# Patient Record
Sex: FEMALE | Race: BLACK OR AFRICAN AMERICAN | Employment: OTHER | URBAN - METROPOLITAN AREA
[De-identification: names, ages, dates, MRNs, and addresses within clinical notes are randomized per-mention and may not be internally consistent; named-entity substitution may affect disease eponyms.]

---

## 2017-02-09 ENCOUNTER — HOSPITAL ENCOUNTER (OUTPATIENT)
Dept: GENERAL RADIOLOGY | Age: 77
Discharge: HOME | End: 2017-02-09
Attending: INTERNAL MEDICINE
Payer: MEDICARE

## 2017-02-09 ENCOUNTER — OFFICE VISIT (OUTPATIENT)
Dept: FAMILY MEDICINE CLINIC | Age: 77
End: 2017-02-09

## 2017-02-09 VITALS
BODY MASS INDEX: 40.81 KG/M2 | OXYGEN SATURATION: 96 % | DIASTOLIC BLOOD PRESSURE: 66 MMHG | WEIGHT: 260 LBS | SYSTOLIC BLOOD PRESSURE: 140 MMHG | TEMPERATURE: 97.8 F | HEIGHT: 67 IN | RESPIRATION RATE: 16 BRPM | HEART RATE: 77 BPM

## 2017-02-09 DIAGNOSIS — R07.89 CHEST DISCOMFORT: ICD-10-CM

## 2017-02-09 DIAGNOSIS — Z77.098 EXPOSURE TO INDUSTRIAL FUMES: ICD-10-CM

## 2017-02-09 DIAGNOSIS — Z77.098 EXPOSURE TO INDUSTRIAL FUMES: Primary | ICD-10-CM

## 2017-02-09 PROCEDURE — 71020 XR CHEST PA LAT: CPT

## 2017-02-09 RX ORDER — DICLOFENAC SODIUM 75 MG/1
75 TABLET, DELAYED RELEASE ORAL 2 TIMES DAILY
Refills: 1 | COMMUNITY
Start: 2017-01-26 | End: 2018-04-30

## 2017-02-09 NOTE — PROGRESS NOTES
Identified pt with two pt identifiers(name and ). Chief Complaint   Patient presents with    Chemical exposure     concerned that she may be inhaling fumes in her home that are harmful        Health Maintenance Due   Topic    GLAUCOMA SCREENING Q2Y        Wt Readings from Last 3 Encounters:   17 260 lb (117.9 kg)   10/20/16 257 lb (116.6 kg)   16 256 lb (116.1 kg)     Temp Readings from Last 3 Encounters:   10/20/16 97.5 °F (36.4 °C) (Oral)   16 98.2 °F (36.8 °C) (Oral)   16 98.1 °F (36.7 °C) (Oral)     BP Readings from Last 3 Encounters:   10/20/16 135/64   16 135/56   16 129/64     Pulse Readings from Last 3 Encounters:   10/20/16 72   16 75   16 78         Learning Assessment:  :     Learning Assessment 2015 3/11/2014   PRIMARY LEARNER Patient Patient   HIGHEST LEVEL OF EDUCATION - PRIMARY LEARNER  GRADUATED HIGH SCHOOL OR GED GRADUATED HIGH SCHOOL OR GED   BARRIERS PRIMARY LEARNER NONE NONE   CO-LEARNER CAREGIVER No No   PRIMARY LANGUAGE ENGLISH ENGLISH   LEARNER PREFERENCE PRIMARY LISTENING READING     - VIDEOS     - DEMONSTRATION   ANSWERED BY Patient patient   RELATIONSHIP SELF SELF       Depression Screening:  :     PHQ 2 / 9, over the last two weeks 2016   Little interest or pleasure in doing things Not at all   Feeling down, depressed or hopeless Not at all   Total Score PHQ 2 0       Fall Risk Assessment:  :     Fall Risk Assessment, last 12 mths 2016   Able to walk? Yes   Fall in past 12 months? No       Abuse Screening:  :     Abuse Screening Questionnaire 10/20/2016 10/14/2014 3/11/2014   Do you ever feel afraid of your partner? N N N   Are you in a relationship with someone who physically or mentally threatens you? N N N   Is it safe for you to go home?  Y Y Y       Coordination of Care Questionnaire:  :     1) Have you been to an emergency room, urgent care clinic since your last visit? no   Hospitalized since your last visit? no 2) Have you seen or consulted any other health care providers outside of [address] [place] [address] since your last visit? Yes, orthopaedist and had mammogram    3) Do you have an Advance Directive on file? no  Are you interested in receiving information about Advance Directives? no    Patient is accompanied by self. Reviewed record in preparation for visit and have obtained necessary documentation. Medication reconciliation up to date and corrected with patient at this time.

## 2017-02-10 LAB
ALBUMIN SERPL-MCNC: 4 G/DL (ref 3.5–4.8)
ALBUMIN/GLOB SERPL: 1.2 {RATIO} (ref 1.1–2.5)
ALP SERPL-CCNC: 66 IU/L (ref 39–117)
ALT SERPL-CCNC: 198 IU/L (ref 0–32)
AST SERPL-CCNC: 139 IU/L (ref 0–40)
BASOPHILS # BLD AUTO: 0 X10E3/UL (ref 0–0.2)
BASOPHILS NFR BLD AUTO: 0 %
BILIRUB SERPL-MCNC: 0.7 MG/DL (ref 0–1.2)
BUN SERPL-MCNC: 14 MG/DL (ref 8–27)
BUN/CREAT SERPL: 17 (ref 11–26)
CALCIUM SERPL-MCNC: 9.4 MG/DL (ref 8.7–10.3)
CHLORIDE SERPL-SCNC: 101 MMOL/L (ref 96–106)
CO2 SERPL-SCNC: 25 MMOL/L (ref 18–29)
CREAT SERPL-MCNC: 0.81 MG/DL (ref 0.57–1)
EOSINOPHIL # BLD AUTO: 0.4 X10E3/UL (ref 0–0.4)
EOSINOPHIL NFR BLD AUTO: 5 %
ERYTHROCYTE [DISTWIDTH] IN BLOOD BY AUTOMATED COUNT: 13.8 % (ref 12.3–15.4)
GLOBULIN SER CALC-MCNC: 3.4 G/DL (ref 1.5–4.5)
GLUCOSE SERPL-MCNC: 85 MG/DL (ref 65–99)
HCT VFR BLD AUTO: 41.7 % (ref 34–46.6)
HGB BLD-MCNC: 13.6 G/DL (ref 11.1–15.9)
IMM GRANULOCYTES # BLD: 0 X10E3/UL (ref 0–0.1)
IMM GRANULOCYTES NFR BLD: 0 %
LYMPHOCYTES # BLD AUTO: 1.5 X10E3/UL (ref 0.7–3.1)
LYMPHOCYTES NFR BLD AUTO: 18 %
MCH RBC QN AUTO: 29.7 PG (ref 26.6–33)
MCHC RBC AUTO-ENTMCNC: 32.6 G/DL (ref 31.5–35.7)
MCV RBC AUTO: 91 FL (ref 79–97)
MONOCYTES # BLD AUTO: 0.5 X10E3/UL (ref 0.1–0.9)
MONOCYTES NFR BLD AUTO: 6 %
NEUTROPHILS # BLD AUTO: 5.8 X10E3/UL (ref 1.4–7)
NEUTROPHILS NFR BLD AUTO: 71 %
PLATELET # BLD AUTO: 308 X10E3/UL (ref 150–379)
POTASSIUM SERPL-SCNC: 4.8 MMOL/L (ref 3.5–5.2)
PROT SERPL-MCNC: 7.4 G/DL (ref 6–8.5)
RBC # BLD AUTO: 4.58 X10E6/UL (ref 3.77–5.28)
SODIUM SERPL-SCNC: 141 MMOL/L (ref 134–144)
WBC # BLD AUTO: 8.2 X10E3/UL (ref 3.4–10.8)

## 2017-02-10 NOTE — PROGRESS NOTES
URGENT:  Labs show very elevated liver function and we need to evaluate this carefully. This is quite new. Has she been drinking any alcohol in addition to exposure to these fumes? I need to recheck her liver function test early next week, if still elevated, she may need further evaluation. She is to avoid being in contact with the fumes over the weekend.

## 2017-02-10 NOTE — PROGRESS NOTES
Pt was advised and verbalized understanding. She reports that she had a  out to the house today and he has taken care of the fumes coming from the pipes. She stated, \"Other than a glass of wine for New Year's she hasn't consumed any alcohol recently. We have scheduled her to return on 02/15/17.

## 2017-02-15 ENCOUNTER — TELEPHONE (OUTPATIENT)
Dept: FAMILY MEDICINE CLINIC | Age: 77
End: 2017-02-15

## 2017-02-15 ENCOUNTER — OFFICE VISIT (OUTPATIENT)
Dept: FAMILY MEDICINE CLINIC | Age: 77
End: 2017-02-15

## 2017-02-15 VITALS
OXYGEN SATURATION: 97 % | WEIGHT: 254 LBS | HEART RATE: 80 BPM | BODY MASS INDEX: 39.87 KG/M2 | DIASTOLIC BLOOD PRESSURE: 61 MMHG | HEIGHT: 67 IN | SYSTOLIC BLOOD PRESSURE: 125 MMHG | RESPIRATION RATE: 16 BRPM | TEMPERATURE: 97.8 F

## 2017-02-15 DIAGNOSIS — R79.89 ABNORMAL LFTS: Primary | ICD-10-CM

## 2017-02-15 DIAGNOSIS — J45.20 REACTIVE AIRWAY DISEASE, MILD INTERMITTENT, UNCOMPLICATED: ICD-10-CM

## 2017-02-15 DIAGNOSIS — K21.9 GASTROESOPHAGEAL REFLUX DISEASE WITHOUT ESOPHAGITIS: ICD-10-CM

## 2017-02-15 RX ORDER — BUDESONIDE AND FORMOTEROL FUMARATE DIHYDRATE 160; 4.5 UG/1; UG/1
2 AEROSOL RESPIRATORY (INHALATION) 2 TIMES DAILY
Qty: 1 INHALER | Refills: 3 | Status: SHIPPED | OUTPATIENT
Start: 2017-02-15 | End: 2017-02-22 | Stop reason: CLARIF

## 2017-02-15 RX ORDER — PHENOL/SODIUM PHENOLATE
20 AEROSOL, SPRAY (ML) MUCOUS MEMBRANE DAILY
Qty: 30 TAB | Refills: 3 | Status: SHIPPED | OUTPATIENT
Start: 2017-02-15 | End: 2018-04-30

## 2017-02-15 NOTE — PROGRESS NOTES
Identified pt with two pt identifiers(name and ). Chief Complaint   Patient presents with    Abnormal liver tests     here to repeat liver function labs - is concerned that the diclofenac tablets could have been affecting her liver so she has quit taking them since we advised her of her labs   [address] Maintenance Due   Topic    GLAUCOMA SCREENING Q2Y        Wt Readings from Last 3 Encounters:   02/15/17 254 lb (115.2 kg)   17 260 lb (117.9 kg)   10/20/16 257 lb (116.6 kg)     Temp Readings from Last 3 Encounters:   17 97.8 °F (36.6 °C) (Oral)   10/20/16 97.5 °F (36.4 °C) (Oral)   16 98.2 °F (36.8 °C) (Oral)     BP Readings from Last 3 Encounters:   17 140/66   10/20/16 135/64   16 135/56     Pulse Readings from Last 3 Encounters:   17 77   10/20/16 72   16 75         Learning Assessment:  :     Learning Assessment 2015 3/11/2014   PRIMARY LEARNER Patient Patient   HIGHEST LEVEL OF EDUCATION - PRIMARY LEARNER  GRADUATED HIGH SCHOOL OR GED GRADUATED HIGH SCHOOL OR GED   BARRIERS PRIMARY LEARNER NONE NONE   CO-LEARNER CAREGIVER No No   PRIMARY LANGUAGE ENGLISH ENGLISH   LEARNER PREFERENCE PRIMARY LISTENING READING     - VIDEOS     - DEMONSTRATION   ANSWERED BY Patient patient   RELATIONSHIP SELF SELF       Depression Screening:  :     PHQ 2 / 9, over the last two weeks 2016   Little interest or pleasure in doing things Not at all   Feeling down, depressed or hopeless Not at all   Total Score PHQ 2 0       Fall Risk Assessment:  :     Fall Risk Assessment, last 12 mths 2016   Able to walk? Yes   Fall in past 12 months? No       Abuse Screening:  :     Abuse Screening Questionnaire 10/20/2016 10/14/2014 3/11/2014   Do you ever feel afraid of your partner? N N N   Are you in a relationship with someone who physically or mentally threatens you? N N N   Is it safe for you to go home?  Elkin Dougherty       Coordination of Care Questionnaire:  :     1) Have you been to an emergency room, urgent care clinic since your last visit? no   Hospitalized since your last visit? no             2) Have you seen or consulted any other health care providers outside of [address] [place] [address] since your last visit? no  (Include any pap smears or colon screenings in this section.)    3) Do you have an Advance Directive on file? No  Are you interested in receiving information about Advance Directives? no    Patient is accompanied by self. Reviewed record in preparation for visit and have obtained necessary documentation. Medication reconciliation up to date and corrected with patient at this time.

## 2017-02-15 NOTE — TELEPHONE ENCOUNTER
Symbicort is too expensive. Not covered by insurance. Alternate medication? Best contact: [phone]  Thanks.

## 2017-02-15 NOTE — MR AVS SNAPSHOT
Visit Information Date & Time Provider Department Dept. Phone Encounter #  
 2/15/2017  1:00 PM Israel Shaikh  Upcoming Health Maintenance Date Due  
 GLAUCOMA SCREENING Q2Y 3/18/2003 Pneumococcal 65+ Low/Medium Risk (2 of 2 - PPSV23) 10/20/2017 MEDICARE YEARLY EXAM 10/21/2017 DTaP/Tdap/Td series (2 - Td) 10/20/2026 Allergies as of 2/15/2017  Review Complete On: 2/15/2017 By: Marylee Gustin, LPN No Known Allergies Current Immunizations  Reviewed on 10/20/2016 Name Date Td, Adsorbed PF 6/30/2015 10:35 AM  
  
 Not reviewed this visit You Were Diagnosed With   
  
 Codes Comments Abnormal LFTs    -  Primary ICD-10-CM: R79.89 ICD-9-CM: 790.6 Gastroesophageal reflux disease without esophagitis     ICD-10-CM: K21.9 ICD-9-CM: 530.81 Reactive airway disease, mild intermittent, uncomplicated     GARY-49-XK: J45.20 ICD-9-CM: 493.90 Vitals BP Pulse Temp Resp Height(growth percentile) Weight(growth percentile) 125/61 (BP 1 Location: Right arm, BP Patient Position: Sitting) 80 97.8 °F (36.6 °C) (Oral) 16 5' 7\" (1.702 m) 254 lb (115.2 kg) SpO2 BMI OB Status Smoking Status 97% 39.78 kg/m2 Ablation Never Smoker Vitals History BMI and BSA Data Body Mass Index Body Surface Area 39.78 kg/m 2 2.33 m 2 Preferred Pharmacy Pharmacy Name Phone [facility]/PHARMACY #[id number] - [place] - [address] [phone] Your Updated Medication List  
  
   
This list is accurate as of: 2/15/17  2:02 PM.  Always use your most recent med list.  
  
  
  
  
 budesonide-formoterol 160-4.5 mcg/actuation HFA inhaler Commonly known as:  SYMBICORT Take 2 Puffs by inhalation two (2) times a day. calcium carbonate 200 mg calcium (500 mg) Chew Commonly known as:  TUMS Take 1 Tab by mouth daily. cyanocobalamin 1,000 mcg sublingual tablet Commonly known as:  VITAMIN B-12 Take 1 tablet by mouth daily. diclofenac EC 75 mg EC tablet Commonly known as:  VOLTAREN Take 75 mg by mouth two (2) times a day. FISH OIL PO Take 1 Tab by mouth daily. Omeprazole delayed release 20 mg tablet Commonly known as:  PRILOSEC D/R Take 1 Tab by mouth daily. pyridoxine (vitamin B6) 100 mg tablet Commonly known as:  VITAMIN B-6 Take 1 tablet by mouth daily. Prescriptions Sent to Pharmacy Refills Omeprazole delayed release (PRILOSEC D/R) 20 mg tablet 3 Sig: Take 1 Tab by mouth daily. Class: Normal  
 Pharmacy: [facility]/pharmacy #[id number] - [place], [address] Ph #: [phone] Route: Oral  
 budesonide-formoterol (SYMBICORT) 160-4.5 mcg/actuation HFA inhaler 3 Sig: Take 2 Puffs by inhalation two (2) times a day. Class: Normal  
 Pharmacy: [facility]/pharmacy #[id number] - [place], [address] Ph #: [phone] Route: Inhalation We Performed the Following HEPATITIS PANEL, ACUTE [45013 CPT(R)] METABOLIC PANEL, COMPREHENSIVE [54293 CPT(R)] Introducing [facility]! Olimpia Chow introduces [employer] patient portal. Now you can access parts of your medical record, email your doctor's office, and request medication refills online. 1. In your internet browser, go to [email or web address] 2. Click on the First Time User? Click Here link in the Sign In box. You will see the New Member Sign Up page. 3. Enter your [employer] Access Code exactly as it appears below. You will not need to use this code after youve completed the sign-up process. If you do not sign up before the expiration date, you must request a new code. · [employer] Access Code: [email or web address] Expires: 5/10/2017  2:22 PM 
 
4. Enter the last four digits of your Social Security Number (xxxx) and Date of Birth (mm/dd/yyyy) as indicated and click Submit.  You will be taken to the next sign-up page. 5. Create a [employer] ID. This will be your [employer] login ID and cannot be changed, so think of one that is secure and easy to remember. 6. Create a [employer] password. You can change your password at any time. 7. Enter your Password Reset Question and Answer. This can be used at a later time if you forget your password. 8. Enter your e-mail address. You will receive e-mail notification when new information is available in [address] 9. Click Sign Up. You can now view and download portions of your medical record. 10. Click the Download Summary menu link to download a portable copy of your medical information. If you have questions, please visit the Frequently Asked Questions section of the [employer] website. Remember, [employer] is NOT to be used for urgent needs. For medical emergencies, dial 911. Now available from your iPhone and Android! Please provide this summary of care documentation to your next provider. Your primary care clinician is listed as [address]. If you have any questions after today's visit, please call [phone].

## 2017-02-15 NOTE — PROGRESS NOTES
CC:  Chief Complaint   Patient presents with    Chemical exposure     concerned that she may be inhaling fumes in her home that are harmful - the  has figured out that two of the drains she does not use have a bacteria that has built up a gas chemical and they are working to correct       [address]  Tl Dickerson is a 64 y.o. female. HPI Comments: 72F with h/o arthritis and high cholesterol presents with new c/o exposure to fumes coming from her pipes at home. Some of the smell was chlorinated. She now has cough and congestion and not feeling well. She reports that a  has come out to check on the pipes and repairs have started. During this time, she did stay in her home. She denies wheezing, SOB. NO headaches. Chemical exposure       ROS:  Review of Systems   All other systems reviewed and are negative. OBJECTIVE:  Visit Vitals    /66 (BP 1 Location: Right arm, BP Patient Position: Sitting)    Pulse 77    Temp 97.8 °F (36.6 °C) (Oral)    Resp 16    Ht 5' 7\" (1.702 m)    Wt 260 lb (117.9 kg)    SpO2 96%    BMI 40.72 kg/m2   Physical Exam   Constitutional: She is oriented to person, place, and time. HENT:   Head: Normocephalic. Eyes: Conjunctivae and EOM are normal. Pupils are equal, round, and reactive to light. Neck: Normal range of motion. Neck supple. Cardiovascular: Normal rate and regular rhythm. Pulmonary/Chest: Effort normal and breath sounds normal.   Musculoskeletal: Normal range of motion. Neurological: She is alert and oriented to person, place, and time. Skin: Skin is warm. Psychiatric: She has a normal mood and affect. Her behavior is normal.   Nursing note and vitals reviewed. ASSESSMENT and PLAN    ICD-10-CM ICD-9-CM    1.  Exposure to industrial fumes Z77.098 V87.2 diclofenac EC (VOLTAREN) 75 mg EC tablet      XR CHEST PA LAT      CBC WITH AUTOMATED DIFF      METABOLIC PANEL, COMPREHENSIVE   2. Chest discomfort R07.89 786.59 diclofenac EC (VOLTAREN) 75 mg EC tablet      XR CHEST PA LAT      CBC WITH AUTOMATED DIFF      METABOLIC PANEL, COMPREHENSIVE     76F who presents with symptoms that she feels are from inhaling fumes from her home. Will send for CXR and also lab work. She was unable to tell me what type of fumes are suspected. She denies cough, but has had some chest discomfort. We discussed that she should try to be out of her home while this is being fixed. She reports that things are better now and that most of the inhalation was last week. Worrisome symptoms discussed in detail. Pt verbalizes understanding of plan of care and denies further questions or concerns at this time. Follow-up Disposition:  Return if symptoms worsen or fail to improve.

## 2017-02-16 LAB
ALBUMIN SERPL-MCNC: 4.1 G/DL (ref 3.5–4.8)
ALBUMIN/GLOB SERPL: 1.5 {RATIO} (ref 1.1–2.5)
ALP SERPL-CCNC: 68 IU/L (ref 39–117)
ALT SERPL-CCNC: 288 IU/L (ref 0–32)
AST SERPL-CCNC: 95 IU/L (ref 0–40)
BILIRUB SERPL-MCNC: 0.9 MG/DL (ref 0–1.2)
BUN SERPL-MCNC: 14 MG/DL (ref 8–27)
BUN/CREAT SERPL: 18 (ref 11–26)
CALCIUM SERPL-MCNC: 9.4 MG/DL (ref 8.7–10.3)
CHLORIDE SERPL-SCNC: 99 MMOL/L (ref 96–106)
CO2 SERPL-SCNC: 26 MMOL/L (ref 18–29)
CREAT SERPL-MCNC: 0.79 MG/DL (ref 0.57–1)
GLOBULIN SER CALC-MCNC: 2.8 G/DL (ref 1.5–4.5)
GLUCOSE SERPL-MCNC: 97 MG/DL (ref 65–99)
HAV IGM SERPL QL IA: NEGATIVE
HBV CORE IGM SERPL QL IA: NEGATIVE
HBV SURFACE AG SERPL QL IA: NEGATIVE
HCV AB S/CO SERPL IA: 0.4 S/CO RATIO (ref 0–0.9)
POTASSIUM SERPL-SCNC: 5 MMOL/L (ref 3.5–5.2)
PROT SERPL-MCNC: 6.9 G/DL (ref 6–8.5)
SODIUM SERPL-SCNC: 139 MMOL/L (ref 134–144)

## 2017-02-20 DIAGNOSIS — R79.89 ELEVATED LFTS: Primary | ICD-10-CM

## 2017-02-20 DIAGNOSIS — R74.01 TRANSAMINITIS: ICD-10-CM

## 2017-02-20 NOTE — PROGRESS NOTES
HISTORY OF PRESENT ILLNESS  Krystin Whittaker is a 64 y.o. female. HPI Comments: Who presents today for follow up and repeat LFT's. I recall that she was seen about 1-week ago and at that time, she c/o being exposed to fumes coming from her pipes. She reports that things are being fixed right now. She has the sensation of a heaviness on her chest. I recall that we did send her for CXR which was normal. Here to repeat the labs as her LFT's were quite elevated. She denies abdominal pain. No N/V/D. Labs         Review of Systems   All other systems reviewed and are negative. Physical Exam   Nursing note and vitals reviewed. ASSESSMENT and PLAN    ICD-10-CM ICD-9-CM    1. Abnormal LFTs P11.27 129.4 METABOLIC PANEL, COMPREHENSIVE      HEPATITIS PANEL, ACUTE   2. Gastroesophageal reflux disease without esophagitis K21.9 530.81    3. Reactive airway disease, mild intermittent, uncomplicated G58.26 049.99 Omeprazole delayed release (PRILOSEC D/R) 20 mg tablet      budesonide-formoterol (SYMBICORT) 160-4.5 mcg/actuation HFA inhaler     Will send for repeat LFT\"s. It is unclear if her exposure is causing the problem. Will also start her on an inhaler due to the sensation she has on her lungs. I have warned her about staying in her home during this time. The patient feels that it is safe. She feels that the fumes are under control at this time. Will advise when the labs return. Pt verbalizes understanding of plan of care and denies further questions or concerns at this time. Follow-up Disposition:  Return if symptoms worsen or fail to improve.

## 2017-02-20 NOTE — PROGRESS NOTES
Spoke with patient about her labs. Her ALT is very elevated at 288. AST went down to 139-->95. I have put her in for a RUQ US. The patient denies N/V/ or significant discomfort. Unclear what is driving the elevated ALT except for recent exposure to fumes in her home - which she now says is associated with her septic tank. I have asked her to return this week to recheck the levels.

## 2017-02-22 ENCOUNTER — OFFICE VISIT (OUTPATIENT)
Dept: FAMILY MEDICINE CLINIC | Age: 77
End: 2017-02-22

## 2017-02-22 VITALS
DIASTOLIC BLOOD PRESSURE: 65 MMHG | HEART RATE: 84 BPM | RESPIRATION RATE: 16 BRPM | TEMPERATURE: 98.9 F | HEIGHT: 67 IN | OXYGEN SATURATION: 97 % | WEIGHT: 254 LBS | BODY MASS INDEX: 39.87 KG/M2 | SYSTOLIC BLOOD PRESSURE: 138 MMHG

## 2017-02-22 DIAGNOSIS — R79.89 ABNORMAL LFTS: Primary | ICD-10-CM

## 2017-02-22 RX ORDER — ALBUTEROL SULFATE 90 UG/1
1 AEROSOL, METERED RESPIRATORY (INHALATION)
Qty: 1 INHALER | Refills: 3 | Status: SHIPPED | OUTPATIENT
Start: 2017-02-22 | End: 2017-03-24

## 2017-02-22 NOTE — PROGRESS NOTES
CC:  Chief Complaint   Patient presents with    Abnormal liver tests     pt here for follow up - reports that she is scheduled for the u/s that was ordered on this coming Monday [address]  Marianna Friend is a 64 y.o. female. HPI Comments: Who presents today for follow up of elevated LFT's. She reports that the gas leak in her house is now under control and she is feeling well. She is here to repeat labs and if still elevated, will pursue further work up. ROS:  Review of Systems   All other systems reviewed and are negative. OBJECTIVE:  Visit Vitals    /65 (BP 1 Location: Right arm, BP Patient Position: Sitting)    Pulse 84    Temp 98.9 °F (37.2 °C) (Oral)    Resp 16    Ht 5' 7\" (1.702 m)    Wt 254 lb (115.2 kg)    SpO2 97%    BMI 39.78 kg/m2   Physical Exam   Cardiovascular: Normal rate and regular rhythm. Pulmonary/Chest: Effort normal.   Abdominal: She exhibits no distension and no mass. There is no tenderness. There is no rebound and no guarding. Nursing note and vitals reviewed. ASSESSMENT and PLAN    ICD-10-CM ICD-9-CM    1. Abnormal LFTs R79.89 790.6 HEPATIC FUNCTION PANEL      GGT      albuterol (PROVENTIL HFA, VENTOLIN HFA, PROAIR HFA) 90 mcg/actuation inhaler     Will advise when the labs return. Pt verbalizes understanding of plan of care and denies further questions or concerns at this time. Follow-up Disposition:  Return if symptoms worsen or fail to improve.

## 2017-02-22 NOTE — MR AVS SNAPSHOT
Visit Information Date & Time Provider Department Dept. Phone Encounter #  
 2/22/2017  1:00 PM Israel Moore [phone] Upcoming Health Maintenance Date Due  
 GLAUCOMA SCREENING Q2Y 3/18/2003 Pneumococcal 65+ Low/Medium Risk (2 of 2 - PPSV23) 10/20/2017 MEDICARE YEARLY EXAM 10/21/2017 DTaP/Tdap/Td series (2 - Td) 10/20/2026 Allergies as of 2/22/2017  Review Complete On: 2/22/2017 By: Shasha Aguilar LPN No Known Allergies Current Immunizations  Reviewed on 10/20/2016 Name Date Td, Adsorbed PF 6/30/2015 10:35 AM  
  
 Not reviewed this visit You Were Diagnosed With   
  
 Codes Comments Abnormal LFTs    -  Primary ICD-10-CM: R79.89 ICD-9-CM: 790.6 Vitals BP  
  
  
  
  
  
 138/65 (BP 1 Location: Right arm, BP Patient Position: Sitting) Vitals History BMI and BSA Data Body Mass Index Body Surface Area 39.78 kg/m 2 2.33 m 2 Preferred Pharmacy Pharmacy Name Phone [facility]/PHARMACY #[id number] - [place] - [address] [phone] Your Updated Medication List  
  
   
This list is accurate as of: 2/22/17  1:33 PM.  Always use your most recent med list.  
  
  
  
  
 albuterol 90 mcg/actuation inhaler Commonly known as:  PROVENTIL HFA, VENTOLIN HFA, PROAIR HFA Take 1 Puff by inhalation every four (4) hours as needed for Wheezing for up to 30 days. beclomethasone 80 mcg/actuation inhaler Commonly known as:  QVAR Take 1 Puff by inhalation two (2) times a day for 30 days. calcium carbonate 200 mg calcium (500 mg) Chew Commonly known as:  TUMS Take 1 Tab by mouth daily. cyanocobalamin 1,000 mcg sublingual tablet Commonly known as:  VITAMIN B-12 Take 1 tablet by mouth daily. diclofenac EC 75 mg EC tablet Commonly known as:  VOLTAREN Take 75 mg by mouth two (2) times a day. FISH OIL PO Take 1 Tab by mouth daily. Omeprazole delayed release 20 mg tablet Commonly known as:  PRILOSEC D/R Take 1 Tab by mouth daily. pyridoxine (vitamin B6) 100 mg tablet Commonly known as:  VITAMIN B-6 Take 1 tablet by mouth daily. Prescriptions Sent to Pharmacy Refills  
 albuterol (PROVENTIL HFA, VENTOLIN HFA, PROAIR HFA) 90 mcg/actuation inhaler 3 Sig: Take 1 Puff by inhalation every four (4) hours as needed for Wheezing for up to 30 days. Class: Normal  
 Pharmacy: [facility]/pharmacy #[id number] - [place], [place] - [address]  #: [phone] Route: Inhalation We Performed the Following GGT [84350 CPT(R)] HEPATIC FUNCTION PANEL [11806 CPT(R)] To-Do List   
 02/27/2017 9:15 AM  
(Arrive by 9:00 AM) Appointment with [address] [place] [address] at [facility] Ultrasound Department ([phone]) General  NPO DIET RESTICTIONS Please be NPO (nothing by mouth) for 6- 8 hours prior to procedure. GENERAL INSTRUCTIONS 1. Bring any non [facility] facility films/reports pertaining to the area being studied with you on the day of appointment. 2. A written order with a valid diagnosis and Physicians signature is required for all scheduled tests. 3. Check in at registration 30 minutes before your appointment time unless you were instructed to do otherwise. Please arrive 15 minutes prior to appointment to register Introducing [facility]! Vishnu Yanez introduces "[employer]" patient portal. Now you can access parts of your medical record, email your doctor's office, and request medication refills online. 1. In your internet browser, go to [email or web address] 2. Click on the First Time User? Click Here link in the Sign In box. You will see the New Member Sign Up page. 3. Enter your "[employer]" Access Code exactly as it appears below. You will not need to use this code after youve completed the sign-up process.  If you do not sign up before the expiration date, you must request a new code. · [employer] Access Code: [email or web address] Expires: 5/10/2017  2:22 PM 
 
4. Enter the last four digits of your Social Security Number (xxxx) and Date of Birth (mm/dd/yyyy) as indicated and click Submit. You will be taken to the next sign-up page. 5. Create a [employer] ID. This will be your [employer] login ID and cannot be changed, so think of one that is secure and easy to remember. 6. Create a [employer] password. You can change your password at any time. 7. Enter your Password Reset Question and Answer. This can be used at a later time if you forget your password. 8. Enter your e-mail address. You will receive e-mail notification when new information is available in [address] 9. Click Sign Up. You can now view and download portions of your medical record. 10. Click the Download Summary menu link to download a portable copy of your medical information. If you have questions, please visit the Frequently Asked Questions section of the [employer] website. Remember, [employer] is NOT to be used for urgent needs. For medical emergencies, dial 911. Now available from your iPhone and Android! Please provide this summary of care documentation to your next provider. Your primary care clinician is listed as [address]. If you have any questions after today's visit, please call [phone].

## 2017-02-23 ENCOUNTER — TELEPHONE (OUTPATIENT)
Dept: FAMILY MEDICINE CLINIC | Age: 77
End: 2017-02-23

## 2017-02-23 LAB
ALBUMIN SERPL-MCNC: 4 G/DL (ref 3.5–4.8)
ALP SERPL-CCNC: 69 IU/L (ref 39–117)
ALT SERPL-CCNC: 87 IU/L (ref 0–32)
AST SERPL-CCNC: 31 IU/L (ref 0–40)
BILIRUB DIRECT SERPL-MCNC: 0.17 MG/DL (ref 0–0.4)
BILIRUB SERPL-MCNC: 0.7 MG/DL (ref 0–1.2)
GGT SERPL-CCNC: 29 IU/L (ref 0–60)
PROT SERPL-MCNC: 7.3 G/DL (ref 6–8.5)

## 2017-02-23 NOTE — TELEPHONE ENCOUNTER
Pt returned call, was advised of her lab results and the need to return in 2 wks for office visit and repeat labs and verbalized understanding.

## 2017-02-23 NOTE — TELEPHONE ENCOUNTER
ALICIAM for pt to call the office to discuss her lab results. Can you please try to put her through to me when she returns the call. Thanks.

## 2017-02-23 NOTE — PROGRESS NOTES
Good news! Liver function test almost back to normal! Dramatic improvement. More than likely, the fumes were causing the problem. Hopefully, she will no longer have exposure to this. Will see her in 2-weeks for general follow up and repeat labs.

## 2017-02-27 ENCOUNTER — HOSPITAL ENCOUNTER (OUTPATIENT)
Dept: ULTRASOUND IMAGING | Age: 77
Discharge: HOME | End: 2017-02-27
Attending: INTERNAL MEDICINE
Payer: MEDICARE

## 2017-02-27 DIAGNOSIS — R74.01 TRANSAMINITIS: ICD-10-CM

## 2017-02-27 DIAGNOSIS — R79.89 ELEVATED LFTS: ICD-10-CM

## 2017-02-27 PROCEDURE — 76705 ECHO EXAM OF ABDOMEN: CPT

## 2017-02-27 NOTE — PROGRESS NOTES
Please let patient know that her liver US and abdominal US was completely normal.   Looks like everything is back to normal.   She should follow up with me in 2-3 months.

## 2017-02-27 NOTE — LETTER
2/27/2017 4:03 PM 
 
Ms. Amari Hatch [address] [phone] Dear Amari Hatch: Please find your most recent results below. Resulted Orders US ABD LTD Narrative ULTRASOUND OF THE RIGHT UPPER QUADRANT INDICATION: Right upper quadrant abdominal pain, elevated LFTs. No fever or 
leukocytosis. COMPARISON: None. TECHNIQUE: 
Sonography of the right upper quadrant was performed. FINDINGS: 
 
GALLBLADDER: No gallstones, wall thickening, or pericholecystic fluid. COMMON DUCT: 0. 5 cm in diameter. The duct is normal caliber. LIVER: Normal echogenicity. No focal hepatic mass or intrahepatic biliary 
dilatation is shown. Incidental note is made of small cysts. PANCREAS: The visualized portions of the pancreas are normal.  
RIGHT KIDNEY: 11.6 cm in length. No hydronephrosis, shadowing calculus, or 
contour-deforming renal mass. Incidental small cyst. 
 
  
 Impression IMPRESSION:  
No acute process shown. RECOMMENDATIONS: 
Your Ultrasound of your liver and abdomen were completely normal. Looks like everything is back to normal. Please follow up with me in 2-3 months. Please call me if you have any questions: [phone] Sincerely, [address] [facility] 1

## 2017-07-11 ENCOUNTER — OFFICE VISIT (OUTPATIENT)
Dept: FAMILY MEDICINE CLINIC | Age: 77
End: 2017-07-11

## 2017-07-11 VITALS
DIASTOLIC BLOOD PRESSURE: 63 MMHG | TEMPERATURE: 98.5 F | OXYGEN SATURATION: 96 % | WEIGHT: 261.4 LBS | HEIGHT: 67 IN | RESPIRATION RATE: 20 BRPM | HEART RATE: 75 BPM | BODY MASS INDEX: 41.03 KG/M2 | SYSTOLIC BLOOD PRESSURE: 129 MMHG

## 2017-07-11 DIAGNOSIS — R26.89 BALANCE PROBLEM: ICD-10-CM

## 2017-07-11 DIAGNOSIS — Z12.11 SCREENING FOR COLON CANCER: ICD-10-CM

## 2017-07-11 DIAGNOSIS — Z13.5 SCREENING FOR GLAUCOMA: ICD-10-CM

## 2017-07-11 DIAGNOSIS — E78.00 PURE HYPERCHOLESTEROLEMIA: Primary | ICD-10-CM

## 2017-07-11 DIAGNOSIS — E55.9 VITAMIN D DEFICIENCY: ICD-10-CM

## 2017-07-11 DIAGNOSIS — R07.89 CHEST DISCOMFORT: ICD-10-CM

## 2017-07-11 DIAGNOSIS — Z77.098 EXPOSURE TO INDUSTRIAL FUMES: ICD-10-CM

## 2017-07-11 RX ORDER — ERGOCALCIFEROL 1.25 MG/1
50000 CAPSULE ORAL
Qty: 5 CAP | Refills: 3 | Status: SHIPPED | OUTPATIENT
Start: 2017-07-11 | End: 2017-08-10

## 2017-07-11 RX ORDER — PYRIDOXINE HCL (VITAMIN B6) 100 MG
100 TABLET ORAL DAILY
Qty: 30 TAB | Refills: 3 | Status: SHIPPED | OUTPATIENT
Start: 2017-07-11

## 2017-07-11 RX ORDER — DICLOFENAC SODIUM 75 MG/1
75 TABLET, DELAYED RELEASE ORAL 2 TIMES DAILY
Qty: 60 TAB | Refills: 1 | Status: CANCELLED | OUTPATIENT
Start: 2017-07-11

## 2017-07-11 NOTE — PROGRESS NOTES
Identified pt with two pt identifiers(name and ). Chief Complaint   Patient presents with    Abnormal liver tests     recheck labs    Referral Shana Cabello and Dr Jennifer Wang from Last 3 Encounters:   17 261 lb 6.4 oz (118.6 kg)   17 254 lb (115.2 kg)   02/15/17 254 lb (115.2 kg)     Temp Readings from Last 3 Encounters:   17 98.5 °F (36.9 °C) (Oral)   17 98.9 °F (37.2 °C) (Oral)   02/15/17 97.8 °F (36.6 °C) (Oral)     BP Readings from Last 3 Encounters:   17 129/63   17 138/65   02/15/17 125/61     Pulse Readings from Last 3 Encounters:   17 75   17 84   02/15/17 80         Learning Assessment:  :     Learning Assessment 2015 3/11/2014   PRIMARY LEARNER Patient Patient   HIGHEST LEVEL OF EDUCATION - PRIMARY LEARNER  GRADUATED HIGH SCHOOL OR GED GRADUATED HIGH SCHOOL OR GED   BARRIERS PRIMARY LEARNER NONE NONE   CO-LEARNER CAREGIVER No No   PRIMARY LANGUAGE ENGLISH ENGLISH   LEARNER PREFERENCE PRIMARY LISTENING READING     - VIDEOS     - DEMONSTRATION   ANSWERED BY Patient patient   RELATIONSHIP SELF SELF       Depression Screening:  :     PHQ over the last two weeks 2017   Little interest or pleasure in doing things Not at all   Feeling down, depressed or hopeless Not at all   Total Score PHQ 2 0       Fall Risk Assessment:  :     Fall Risk Assessment, last 12 mths 2017   Able to walk? Yes   Fall in past 12 months? No       Abuse Screening:  :     Abuse Screening Questionnaire 2017 10/20/2016 10/14/2014 3/11/2014   Do you ever feel afraid of your partner? N N N N   Are you in a relationship with someone who physically or mentally threatens you? N N N N   Is it safe for you to go home?  Y Y Y Y       Coordination of Care Questionnaire:  :     1) Have you been to an emergency room, urgent care clinic since your last visit? no   Hospitalized since your last visit? no             2) Have you seen or consulted any other health care providers outside of [address] [place] [address] since your last visit? no  (Include any pap smears or colon screenings in this section.)    3) Do you have an Advance Directive on file? no  Are you interested in receiving information about Advance Directives? no    Reviewed record in preparation for visit and have obtained necessary documentation. Medication reconciliation up to date and corrected with patient at this time.    She is allergic the the powder in latex gloves

## 2017-07-11 NOTE — MR AVS SNAPSHOT
Visit Information Date & Time Provider Department Dept. Phone Encounter #  
 7/11/2017  1:00 PM Ned Thompson Israel Travis [phone] Upcoming Health Maintenance Date Due  
 GLAUCOMA SCREENING Q2Y 3/18/2003 INFLUENZA AGE 9 TO ADULT 8/1/2017 Pneumococcal 65+ Low/Medium Risk (2 of 2 - PPSV23) 10/20/2017 MEDICARE YEARLY EXAM 10/21/2017 DTaP/Tdap/Td series (2 - Td) 10/20/2026 Allergies as of 7/11/2017  Review Complete On: 7/11/2017 By: Ned Thompson MD  
  
 Severity Noted Reaction Type Reactions Latex  07/11/2017    Other (comments) Patient is allergic to the powder in the gloves. Itchy eyes and rash. Current Immunizations  Reviewed on 10/20/2016 Name Date Td, Adsorbed PF 6/30/2015 10:35 AM  
  
 Not reviewed this visit You Were Diagnosed With   
  
 Codes Comments Pure hypercholesterolemia    -  Primary ICD-10-CM: E78.00 ICD-9-CM: 272.0 Chest discomfort     ICD-10-CM: R07.89 ICD-9-CM: 786.59 Exposure to industrial fumes     ICD-10-CM: N02.527 ICD-9-CM: V87.2 Screening for glaucoma     ICD-10-CM: Z13.5 ICD-9-CM: V80.1 Vitamin D deficiency     ICD-10-CM: E55.9 ICD-9-CM: 268.9 Screening for colon cancer     ICD-10-CM: Z12.11 ICD-9-CM: V76.51 Vitals BP Pulse Temp Resp Height(growth percentile) Weight(growth percentile) 129/63 (BP 1 Location: Right arm, BP Patient Position: Sitting) 75 98.5 °F (36.9 °C) (Oral) 20 5' 7\" (1.702 m) 261 lb 6.4 oz (118.6 kg) SpO2 BMI OB Status Smoking Status 96% 40.94 kg/m2 Ablation Never Smoker Vitals History BMI and BSA Data Body Mass Index Body Surface Area 40.94 kg/m 2 2.37 m 2 Preferred Pharmacy Pharmacy Name Phone [id number] [place] - [address] [facility] [address] [phone] Your Updated Medication List  
  
   
This list is accurate as of: 7/11/17  1:37 PM.  Always use your most recent med list.  
  
  
  
  
 calcium carbonate 200 mg calcium (500 mg) Chew Commonly known as:  TUMS Take 1 Tab by mouth daily. cyanocobalamin 1,000 mcg sublingual tablet Commonly known as:  VITAMIN B-12 Take 1 tablet by mouth daily. diclofenac EC 75 mg EC tablet Commonly known as:  VOLTAREN Take 75 mg by mouth two (2) times a day. ergocalciferol 50,000 unit capsule Commonly known as:  ERGOCALCIFEROL Take 1 Cap by mouth every seven (7) days for 30 days. FISH OIL PO Take 1 Tab by mouth daily. Omeprazole delayed release 20 mg tablet Commonly known as:  PRILOSEC D/R Take 1 Tab by mouth daily. pyridoxine (vitamin B6) 100 mg tablet Commonly known as:  VITAMIN B-6 Take 1 Tab by mouth daily. Prescriptions Sent to Pharmacy Refills  
 pyridoxine, vitamin B6, (VITAMIN B-6) 100 mg tablet 3 Sig: Take 1 Tab by mouth daily. Class: Normal  
 Pharmacy: [facility] #[id number] [address] Ph #: [phone] Route: Oral  
 ergocalciferol (ERGOCALCIFEROL) 50,000 unit capsule 3 Sig: Take 1 Cap by mouth every seven (7) days for 30 days. Class: Normal  
 Pharmacy: [facility]/pharmacy #[id number] [address] Ph #: [phone] Route: Oral  
  
We Performed the Following CBC WITH AUTOMATED DIFF [90021 CPT(R)] CRP, HIGH SENSITIVITY [15074 CPT(R)] LIPID PANEL [73285 CPT(R)] METABOLIC PANEL, COMPREHENSIVE [33879 CPT(R)] REFERRAL TO GASTROENTEROLOGY [JCA85 Custom] Comments:  
 Please evaluate patient for follow up and possible colonoscopy. REFERRAL TO OPHTHALMOLOGY [REF57 Custom] Comments:  
 Please evaluate patient for screening for glaucoma. VITAMIN D, 25 HYDROXY [id number] CPT(R)] Referral Information Referral ID Referred By Referred To  
  
 [id number] Trinity Bella [facility] [address] [facility], [address] Visits Status Start Date End Date 1 New Request 7/11/17 7/11/18 If your referral has a status of pending review or denied, additional information will be sent to support the outcome of this decision. Referral ID Referred By Referred To  
 [id number] Trinity Bella [facility] [place]  
   [address] Senia Kim Phone: [phone] Fax: [phone] Visits Status Start Date End Date 1 New Request 7/11/17 7/11/18 If your referral has a status of pending review or denied, additional information will be sent to support the outcome of this decision. Introducing [facility] & HEALTH SERVICES! Taina Durán introduces [employer] patient portal. Now you can access parts of your medical record, email your doctor's office, and request medication refills online. 1. In your internet browser, go to [email or web address] 2. Click on the First Time User? Click Here link in the Sign In box. You will see the New Member Sign Up page. 3. Enter your Songza Access Code exactly as it appears below. You will not need to use this code after youve completed the sign-up process. If you do not sign up before the expiration date, you must request a new code. · [employer] Access Code: [email or web address] Expires: 10/9/2017  1:20 PM 
 
4. Enter the last four digits of your Social Security Number (xxxx) and Date of Birth (mm/dd/yyyy) as indicated and click Submit. You will be taken to the next sign-up page. 5. Create a [employer] ID. This will be your Songza login ID and cannot be changed, so think of one that is secure and easy to remember. 6. Create a [employer] password. You can change your password at any time. 7. Enter your Password Reset Question and Answer. This can be used at a later time if you forget your password. 8. Enter your e-mail address. You will receive e-mail notification when new information is available in [address] 9. Click Sign Up. You can now view and download portions of your medical record. 10. Click the Download Summary menu link to download a portable copy of your medical information. If you have questions, please visit the Frequently Asked Questions section of the [employer] website. Remember, [employer] is NOT to be used for urgent needs. For medical emergencies, dial 911. Now available from your iPhone and Android! Please provide this summary of care documentation to your next provider. Your primary care clinician is listed as [address]. If you have any questions after today's visit, please call [phone].

## 2017-07-17 ENCOUNTER — TELEPHONE (OUTPATIENT)
Dept: FAMILY MEDICINE CLINIC | Age: 77
End: 2017-07-17

## 2017-07-17 ENCOUNTER — LAB ONLY (OUTPATIENT)
Dept: FAMILY MEDICINE CLINIC | Age: 77
End: 2017-07-17

## 2017-07-17 NOTE — TELEPHONE ENCOUNTER
Patient is taking vitamin D supplements at 2000 units, daily. Patient's paperwork states that her medication list has been updated to show vitamin D 50,000 units every 7 days. Patient wants clarification on which she is to be taking. Salina Mccullough had labs drawn 7/17/17      Best contact: [phone]    Thank you.

## 2017-07-18 LAB
25(OH)D3+25(OH)D2 SERPL-MCNC: 25.5 NG/ML (ref 30–100)
ALBUMIN SERPL-MCNC: 3.8 G/DL (ref 3.5–4.8)
ALBUMIN/GLOB SERPL: 1.2 {RATIO} (ref 1.2–2.2)
ALP SERPL-CCNC: 64 IU/L (ref 39–117)
ALT SERPL-CCNC: 42 IU/L (ref 0–32)
AST SERPL-CCNC: 32 IU/L (ref 0–40)
BASOPHILS # BLD AUTO: 0 X10E3/UL (ref 0–0.2)
BASOPHILS NFR BLD AUTO: 0 %
BILIRUB SERPL-MCNC: 1 MG/DL (ref 0–1.2)
BUN SERPL-MCNC: 14 MG/DL (ref 8–27)
BUN/CREAT SERPL: 19 (ref 12–28)
CALCIUM SERPL-MCNC: 9.2 MG/DL (ref 8.7–10.3)
CHLORIDE SERPL-SCNC: 101 MMOL/L (ref 96–106)
CHOLEST SERPL-MCNC: 240 MG/DL (ref 100–199)
CO2 SERPL-SCNC: 23 MMOL/L (ref 18–29)
CREAT SERPL-MCNC: 0.72 MG/DL (ref 0.57–1)
CRP SERPL HS-MCNC: 7.44 MG/L (ref 0–3)
EOSINOPHIL # BLD AUTO: 0.3 X10E3/UL (ref 0–0.4)
EOSINOPHIL NFR BLD AUTO: 5 %
ERYTHROCYTE [DISTWIDTH] IN BLOOD BY AUTOMATED COUNT: 14.6 % (ref 12.3–15.4)
GLOBULIN SER CALC-MCNC: 3.2 G/DL (ref 1.5–4.5)
GLUCOSE SERPL-MCNC: 90 MG/DL (ref 65–99)
HCT VFR BLD AUTO: 41.3 % (ref 34–46.6)
HDLC SERPL-MCNC: 70 MG/DL
HGB BLD-MCNC: 13 G/DL (ref 11.1–15.9)
IMM GRANULOCYTES # BLD: 0 X10E3/UL (ref 0–0.1)
IMM GRANULOCYTES NFR BLD: 0 %
INTERPRETATION, 910389: NORMAL
LDLC SERPL CALC-MCNC: 155 MG/DL (ref 0–99)
LYMPHOCYTES # BLD AUTO: 1.5 X10E3/UL (ref 0.7–3.1)
LYMPHOCYTES NFR BLD AUTO: 21 %
MCH RBC QN AUTO: 28.9 PG (ref 26.6–33)
MCHC RBC AUTO-ENTMCNC: 31.5 G/DL (ref 31.5–35.7)
MCV RBC AUTO: 92 FL (ref 79–97)
MONOCYTES # BLD AUTO: 0.3 X10E3/UL (ref 0.1–0.9)
MONOCYTES NFR BLD AUTO: 4 %
NEUTROPHILS # BLD AUTO: 4.9 X10E3/UL (ref 1.4–7)
NEUTROPHILS NFR BLD AUTO: 70 %
PLATELET # BLD AUTO: 287 X10E3/UL (ref 150–379)
POTASSIUM SERPL-SCNC: 4.5 MMOL/L (ref 3.5–5.2)
PROT SERPL-MCNC: 7 G/DL (ref 6–8.5)
RBC # BLD AUTO: 4.5 X10E6/UL (ref 3.77–5.28)
SODIUM SERPL-SCNC: 141 MMOL/L (ref 134–144)
TRIGL SERPL-MCNC: 77 MG/DL (ref 0–149)
VLDLC SERPL CALC-MCNC: 15 MG/DL (ref 5–40)
WBC # BLD AUTO: 7.1 X10E3/UL (ref 3.4–10.8)

## 2017-07-31 ENCOUNTER — TELEPHONE (OUTPATIENT)
Dept: FAMILY MEDICINE CLINIC | Age: 77
End: 2017-07-31

## 2017-07-31 NOTE — PROGRESS NOTES
CC:  Chief Complaint   Patient presents with    Abnormal liver tests     recheck labs    Referral Emily Meade and Dr Gabriel Peterson is a 76 y.o. female. HPI Comments: Who presents for routine follow up. She reports that she has been feeling well and no major medical concerns. She has had her kitchen and gas leak issue checked. She needs a referral to ophthalmology and also gastroenterology. Referral Follow Up          ROS:  Review of Systems   All other systems reviewed and are negative. OBJECTIVE:  /63 (BP 1 Location: Right arm, BP Patient Position: Sitting)  Pulse 75  Temp 98.5 °F (36.9 °C) (Oral)   Resp 20  Ht 5' 7\" (1.702 m)  Wt 261 lb 6.4 oz (118.6 kg)  SpO2 96%  BMI 40.94 kg/m2  Physical Exam   HENT:   Head: Normocephalic. Eyes: Pupils are equal, round, and reactive to light. Neck: Normal range of motion. Cardiovascular: Normal rate and regular rhythm. Pulmonary/Chest: Effort normal and breath sounds normal.   Musculoskeletal: Normal range of motion. Neurological: She is alert. Nursing note and vitals reviewed. ASSESSMENT and PLAN    ICD-10-CM ICD-9-CM    1. Pure hypercholesterolemia E78.00 272.0 pyridoxine, vitamin B6, (VITAMIN B-6) 100 mg tablet      LIPID PANEL      CRP, HIGH SENSITIVITY      METABOLIC PANEL, COMPREHENSIVE      CBC WITH AUTOMATED DIFF   2. Chest discomfort R07.89 786.59    3. Exposure to industrial fumes Z77.098 V87.2    4. Screening for glaucoma Z13.5 V80.1 REFERRAL TO OPHTHALMOLOGY   5. Vitamin D deficiency E55.9 268.9 VITAMIN D, 25 HYDROXY   6. Screening for colon cancer Z12.11 V76.51 REFERRAL TO GASTROENTEROLOGY   7. Balance problem R26.89 781.99 REFERRAL TO PHYSICAL THERAPY     I have discussed the diagnosis with the patient and the intended treatment plan as seen in the above orders. The patient has received an after-visit summary and questions were answered concerning future plans. Asked to return should symptoms worsen or not improve with treatment. Any pending labs and studies will be relayed to patient when they become available. Pt verbalizes understanding of plan of care and denies further questions or concerns at this time. Follow-up Disposition:  Return if symptoms worsen or fail to improve.

## 2018-01-09 ENCOUNTER — HOSPITAL ENCOUNTER (OUTPATIENT)
Dept: MAMMOGRAPHY | Age: 78
Discharge: HOME | End: 2018-01-09
Attending: INTERNAL MEDICINE
Payer: MEDICARE

## 2018-01-09 DIAGNOSIS — Z12.31 VISIT FOR SCREENING MAMMOGRAM: ICD-10-CM

## 2018-01-09 PROCEDURE — 77067 SCR MAMMO BI INCL CAD: CPT

## 2018-04-30 ENCOUNTER — OFFICE VISIT (OUTPATIENT)
Dept: FAMILY MEDICINE CLINIC | Age: 78
End: 2018-04-30

## 2018-04-30 VITALS
BODY MASS INDEX: 41.03 KG/M2 | HEART RATE: 77 BPM | SYSTOLIC BLOOD PRESSURE: 122 MMHG | WEIGHT: 261.4 LBS | TEMPERATURE: 98.2 F | RESPIRATION RATE: 20 BRPM | OXYGEN SATURATION: 96 % | HEIGHT: 67 IN | DIASTOLIC BLOOD PRESSURE: 60 MMHG

## 2018-04-30 DIAGNOSIS — Z13.820 SCREENING FOR OSTEOPOROSIS: ICD-10-CM

## 2018-04-30 DIAGNOSIS — Z78.0 POST-MENOPAUSE: ICD-10-CM

## 2018-04-30 DIAGNOSIS — Z12.11 SCREENING FOR COLON CANCER: ICD-10-CM

## 2018-04-30 DIAGNOSIS — Z80.0 FAMILY HISTORY OF COLON CANCER IN MOTHER: ICD-10-CM

## 2018-04-30 DIAGNOSIS — Z00.00 MEDICARE ANNUAL WELLNESS VISIT, SUBSEQUENT: Primary | ICD-10-CM

## 2018-04-30 DIAGNOSIS — E55.9 VITAMIN D DEFICIENCY: ICD-10-CM

## 2018-04-30 DIAGNOSIS — Z13.5 SCREENING FOR GLAUCOMA: ICD-10-CM

## 2018-04-30 DIAGNOSIS — G89.29 CHRONIC PAIN OF RIGHT KNEE: ICD-10-CM

## 2018-04-30 DIAGNOSIS — M25.561 CHRONIC PAIN OF RIGHT KNEE: ICD-10-CM

## 2018-04-30 DIAGNOSIS — E78.00 HYPERCHOLESTEREMIA: ICD-10-CM

## 2018-04-30 PROBLEM — E66.01 OBESITY, MORBID (HCC): Status: ACTIVE | Noted: 2018-04-30

## 2018-04-30 RX ORDER — FERROUS SULFATE, DRIED 160(50) MG
1 TABLET, EXTENDED RELEASE ORAL 2 TIMES DAILY WITH MEALS
COMMUNITY
End: 2018-09-21

## 2018-04-30 NOTE — MR AVS SNAPSHOT
[address] [place] [address] 
 
 
 [facility] [address] [facility] 
[phone] Patient: Manuel Last MRN: [id number]:[id number] Visit Information Date & Time Provider Department Dept. Phone Encounter #  
 4/30/2018  2:15 PM Israel Tse [phone] Follow-up Instructions Return in about 1 year (around 4/30/2019), or if symptoms worsen or fail to improve, for Annual wellness visit. Upcoming Health Maintenance Date Due  
 GLAUCOMA SCREENING Q2Y 3/18/2003 Pneumococcal 65+ Low/Medium Risk (2 of 2 - PPSV23) 5/1/2018* Influenza Age 5 to Adult 8/1/2018 MEDICARE YEARLY EXAM 5/1/2019 DTaP/Tdap/Td series (2 - Td) 10/20/2026 *Topic was postponed. The date shown is not the original due date. Allergies as of 4/30/2018  Review Complete On: 4/30/2018 By: Naseem Ugalde MD  
  
 Severity Noted Reaction Type Reactions Latex  07/11/2017    Other (comments) Patient is allergic to the powder in the gloves. Itchy eyes and rash. Current Immunizations  Reviewed on 4/30/2018 Name Date Td, Adsorbed PF 6/30/2015 10:35 AM  
  
 Reviewed by Naseem Ugalde MD on 4/30/2018 at  3:18 PM  
You Were Diagnosed With   
  
 Codes Comments Medicare annual wellness visit, subsequent    -  Primary ICD-10-CM: Z00.00 ICD-9-CM: V70.0 Screening for osteoporosis     ICD-10-CM: Z13.820 ICD-9-CM: V82.81 Post-menopause     ICD-10-CM: Z78.0 ICD-9-CM: V49.81 Screening for colon cancer     ICD-10-CM: Z12.11 ICD-9-CM: V76.51 Family history of colon cancer in mother     ICD-10-CM: Z80.0 ICD-9-CM: V16.0 Screening for glaucoma     ICD-10-CM: Z13.5 ICD-9-CM: V80.1 Chronic pain of right knee     ICD-10-CM: M25.561, G89.29 ICD-9-CM: 719.46, 338.29 Hypercholesteremia     ICD-10-CM: E78.00 ICD-9-CM: 272.0 Vitals BP Pulse Temp Resp Height(growth percentile) Weight(growth percentile) 122/60 (BP 1 Location: Right arm, BP Patient Position: Sitting) 77 98.2 °F (36.8 °C) (Oral) 20 5' 7\" (1.702 m) 261 lb 6.4 oz (118.6 kg) SpO2 BMI OB Status Smoking Status 96% 40.94 kg/m2 Ablation Never Smoker BMI and BSA Data Body Mass Index Body Surface Area 40.94 kg/m 2 2.37 m 2 Preferred Pharmacy Pharmacy Name Phone [id number] George Ceballos [facility] [phone] Your Updated Medication List  
  
   
This list is accurate as of 4/30/18  3:56 PM.  Always use your most recent med list.  
  
  
  
  
 calcium carbonate 200 mg calcium (500 mg) Paty Bogdan Commonly known as:  TUMS Take 1 Tab by mouth daily. calcium-vitamin D 500 mg(1,250mg) -200 unit per tablet Commonly known as:  OYSTER SHELL Take 1 Tab by mouth two (2) times daily (with meals). cyanocobalamin 1,000 mcg sublingual tablet Commonly known as:  VITAMIN B-12 Take 1 tablet by mouth daily. pyridoxine (vitamin B6) 100 mg tablet Commonly known as:  VITAMIN B-6 Take 1 Tab by mouth daily. We Performed the Following AMB POC EKG ROUTINE W/ 12 LEADS, INTER & REP [93295 CPT(R)] REFERRAL TO GASTROENTEROLOGY [YER75 Custom] Comments: For routine screening colonoscopy. REFERRAL TO OPHTHALMOLOGY [REF57 Custom] REFERRAL TO PHYSICAL THERAPY [ODW20 Custom] Comments: For ongoing knee pain and needs to have quadricepts strengthened. Follow-up Instructions Return in about 1 year (around 4/30/2019), or if symptoms worsen or fail to improve, for Annual wellness visit. To-Do List   
 05/07/2018 Imaging:  DEXA BONE DENSITY STUDY AXIAL Referral Information Referral ID Referred By Referred To  
  
 [id number] Esperanza Perez [facility] \[facility] [place] [facility]"" AT [facility] [address] Phone: [phone] Fax: [phone] Visits Status Start Date End Date 1 New Request 4/30/18 4/30/19 If your referral has a status of pending review or denied, additional information will be sent to support the outcome of this decision. Referral ID Referred By Referred To  
 [id number] Edenilson Castillo [employer] [address] [place], [address] Visits Status Start Date End Date 1 New Request 4/30/18 4/30/19 If your referral has a status of pending review or denied, additional information will be sent to support the outcome of this decision. Referral ID Referred By Referred To  
 [id number] Hector Hurtado, PT  
   [address] [facility] [address] Giacomo Ruffin, [address] [place] [address] Phone: [phone] Fax: [phone] Visits Status Start Date End Date 1 New Request 4/30/18 4/30/19 If your referral has a status of pending review or denied, additional information will be sent to support the outcome of this decision. Patient Instructions Medicare Wellness Visit, Female The best way to live healthy is to have a healthy lifestyle by eating a well-balanced diet, exercising regularly, limiting alcohol and stopping smoking. Regular physical exams and screening tests are another way to keep healthy. Preventive exams provided by your health care provider can find health problems before they become diseases or illnesses. Preventive services including immunizations, screening tests, monitoring and exams can help you take care of your own health. All people over age 72 should have a pneumovax  and and a prevnar shot to prevent pneumonia. These are once in a lifetime unless you and your provider decide differently. All people over 65 should have a yearly flu shot and a tetanus vaccine every 10 years. A bone mass density to screen for osteoporosis or thinning of the bones should be done every 2 years after 65. Screening for diabetes mellitus with a blood sugar test should be done every year. Glaucoma is a disease of the eye due to increased ocular pressure that can lead to blindness and it should be done every year by an eye professional. 
 
Cardiovascular screening tests that check for elevated lipids (fatty part of blood) which can lead to heart disease and strokes should be done every 5 years. Colorectal screening that evaluates for blood or polyps in your colon should be done yearly as a stool test or every five years as a flexible sigmoidoscope or every 10 years as a colonoscopy up to age 76. Breast cancer screening with a mammogram is recommended biennially  for women age 54-69. Screening for cervical cancer with a pap smear and pelvic exam is recommended for women after age 72 years every 2 years up to age 79 or when the provider and patient decide to stop. If there is a history of cervical abnormalities or other increased risk for cancer then the test is recommended yearly. Hepatitis C screening is also recommended for anyone born between 80 through [address]. A shingles vaccine is also recommended once in a lifetime after age 61. Your Medicare Wellness Exam is recommended annually. Here is a list of your current Health Maintenance items with a due date: 
Health Maintenance Due Topic Date Due  Glaucoma Screening   03/18/2003 Introducing [facility] & HEALTH SERVICES! Brian Pandya introduces [employer] patient portal. Now you can access parts of your medical record, email your doctor's office, and request medication refills online. 1. In your internet browser, go to [email or web address] 2. Click on the First Time User? Click Here link in the Sign In box. You will see the New Member Sign Up page. 3. Enter your [employer] Access Code exactly as it appears below. You will not need to use this code after youve completed the sign-up process.  If you do not sign up before the expiration date, you must request a new code. · [employer] Access Code: [email or web address] Expires: 7/29/2018  3:20 PM 
 
4. Enter the last four digits of your Social Security Number (xxxx) and Date of Birth (mm/dd/yyyy) as indicated and click Submit. You will be taken to the next sign-up page. 5. Create a [employer] ID. This will be your [employer] login ID and cannot be changed, so think of one that is secure and easy to remember. 6. Create a [employer] password. You can change your password at any time. 7. Enter your Password Reset Question and Answer. This can be used at a later time if you forget your password. 8. Enter your e-mail address. You will receive e-mail notification when new information is available in [address] 9. Click Sign Up. You can now view and download portions of your medical record. 10. Click the Download Summary menu link to download a portable copy of your medical information. If you have questions, please visit the Frequently Asked Questions section of the [employer] website. Remember, [employer] is NOT to be used for urgent needs. For medical emergencies, dial 911. Now available from your iPhone and Android! Please provide this summary of care documentation to your next provider. Your primary care clinician is listed as [address]. If you have any questions after today's visit, please call [phone].

## 2018-04-30 NOTE — PATIENT INSTRUCTIONS

## 2018-04-30 NOTE — PROGRESS NOTES
Identified pt with two pt identifiers(name and ). Chief Complaint   Patient presents with   [facility] Annual Wellness Visit     Medicare wellness visit        Health Maintenance Due   Topic    GLAUCOMA SCREENING Q2Y     Pneumococcal 65+ Low/Medium Risk (2 of 2 - PPSV23)    MEDICARE YEARLY EXAM        Wt Readings from Last 3 Encounters:   17 261 lb 6.4 oz (118.6 kg)   17 254 lb (115.2 kg)   02/15/17 254 lb (115.2 kg)     Temp Readings from Last 3 Encounters:   17 98.5 °F (36.9 °C) (Oral)   17 98.9 °F (37.2 °C) (Oral)   02/15/17 97.8 °F (36.6 °C) (Oral)     BP Readings from Last 3 Encounters:   17 129/63   17 138/65   02/15/17 125/61     Pulse Readings from Last 3 Encounters:   17 75   17 84   02/15/17 80         Learning Assessment:  :     Learning Assessment 2015 3/11/2014   PRIMARY LEARNER Patient Patient   HIGHEST LEVEL OF EDUCATION - PRIMARY LEARNER  GRADUATED HIGH SCHOOL OR GED GRADUATED HIGH SCHOOL OR GED   BARRIERS PRIMARY LEARNER NONE NONE   CO-LEARNER CAREGIVER No No   PRIMARY LANGUAGE ENGLISH ENGLISH   LEARNER PREFERENCE PRIMARY LISTENING READING     - VIDEOS     - DEMONSTRATION   ANSWERED BY Patient patient   RELATIONSHIP SELF SELF       Depression Screening:  :     PHQ over the last two weeks 2018   Little interest or pleasure in doing things Not at all   Feeling down, depressed or hopeless Not at all   Total Score PHQ 2 0       Fall Risk Assessment:  :     Fall Risk Assessment, last 12 mths 2018   Able to walk? Yes   Fall in past 12 months? No       Abuse Screening:  :     Abuse Screening Questionnaire 2017 10/20/2016 10/14/2014 3/11/2014   Do you ever feel afraid of your partner? N N N N   Are you in a relationship with someone who physically or mentally threatens you? N N N N   Is it safe for you to go home?  [place] Kaba       Coordination of Care Questionnaire:  :     1) Have you been to an emergency room, urgent care clinic since your last visit? no   Hospitalized since your last visit? no             2) Have you seen or consulted any other health care providers outside of [address] [place] [address] since your last visit? Yes Opthalmology, Dr. Leonidas Saunders  (Include any pap smears or colon screenings in this section.)    3) Do you have an Advance Directive on file? no  Are you interested in receiving information about Advance Directives? no    Reviewed record in preparation for visit and have obtained necessary documentation. Medication reconciliation up to date and corrected with patient at this time.

## 2018-04-30 NOTE — PROGRESS NOTES
CC:  Chief Complaint   Patient presents with   Esther Lazar Annual Wellness Visit     Medicare wellness visit     This is the Subsequent Medicare Annual Wellness Exam, performed 12 months or more after the Initial AWV or the last Subsequent AWV    I have reviewed the patient's medical history in detail and updated the computerized patient record. History     Past Medical History:   Diagnosis Date    Arthritis     Hypercholesterolemia       History reviewed. No pertinent surgical history. Current Outpatient Prescriptions   Medication Sig Dispense Refill    calcium-vitamin D (OYSTER SHELL) 500 mg(1,250mg) -200 unit per tablet Take 1 Tab by mouth two (2) times daily (with meals).  pyridoxine, vitamin B6, (VITAMIN B-6) 100 mg tablet Take 1 Tab by mouth daily. 30 Tab 3    cyanocobalamin (VITAMIN B-12) 1,000 mcg sublingual tablet Take 1 tablet by mouth daily. 30 tablet 3    calcium carbonate (TUMS) 200 mg calcium (500 mg) Chew Take 1 Tab by mouth daily. Allergies   Allergen Reactions    Latex Other (comments)     Patient is allergic to the powder in the gloves. Itchy eyes and rash. Family History   Problem Relation Age of Onset    Breast Cancer Mother 54    No Known Problems Father      Social History   Substance Use Topics    Smoking status: Never Smoker    Smokeless tobacco: Never Used    Alcohol use No     Patient Active Problem List   Diagnosis Code    Hypercholesteremia E78.00    Symptomatic menopausal or female climacteric states N95.1    Vitamin D deficiency E55.9    Obesity, morbid ([address] E66.01       Depression Risk Factor Screening:     PHQ over the last two weeks 4/30/2018   Little interest or pleasure in doing things Not at all   Feeling down, depressed or hopeless Not at all   Total Score PHQ 2 0     Alcohol Risk Factor Screening: You do not drink alcohol or very rarely. Functional Ability and Level of Safety:   Hearing Loss  Hearing is good.     Activities of Daily Living  The home contains: no safety equipment. Patient does total self care    Fall Risk  Fall Risk Assessment, last 12 mths 4/30/2018   Able to walk? Yes   Fall in past 12 months? No       Abuse Screen  Patient is not abused    Cognitive Screening   Evaluation of Cognitive Function:  Has your family/caregiver stated any concerns about your memory: no  Normal    Patient Care Team   Patient Care Team:  Nelly Stringer MD as PCP - General (Pediatrics)    Assessment/Plan   Education and counseling provided:  Are appropriate based on today's review and evaluation  End-of-Life planning (with patient's consent)  Pneumococcal Vaccine  Screening Mammography  Colorectal cancer screening tests  Bone mass measurement (DEXA)  Screening for glaucoma    Diagnoses and all orders for this visit:    1. Medicare annual wellness visit, subsequent  Anticipatory guidance discussed. Immunizations reviewed  HM updated. 2. Screening for osteoporosis  -     DEXA BONE DENSITY STUDY AXIAL; Future    3. Post-menopause  -     DEXA BONE DENSITY STUDY AXIAL; Future    4. Screening for colon cancer  -     REFERRAL TO GASTROENTEROLOGY    5. Family history of colon cancer in mother  -     REFERRAL TO GASTROENTEROLOGY        Health Maintenance Due   Topic Date Due    GLAUCOMA SCREENING Q2Y  Referral given. I have discussed the diagnosis with the patient and the intended treatment plan as seen in the above orders. The patient has received an after-visit summary and questions were answered concerning future plans. Asked to return should symptoms worsen or not improve with treatment. Any pending labs and studies will be relayed to patient when they become available. Pt verbalizes understanding of plan of care and denies further questions or concerns at this time. Follow-up Disposition:  Return in about 1 year (around 4/30/2019), or if symptoms worsen or fail to improve, for Annual wellness visit.

## 2018-05-08 ENCOUNTER — LAB ONLY (OUTPATIENT)
Dept: FAMILY MEDICINE CLINIC | Age: 78
End: 2018-05-08

## 2018-05-09 LAB
25(OH)D3+25(OH)D2 SERPL-MCNC: 23.2 NG/ML (ref 30–100)
ALBUMIN SERPL-MCNC: 3.6 G/DL (ref 3.5–4.7)
ALBUMIN/GLOB SERPL: 1.2 {RATIO} (ref 1.2–2.2)
ALP SERPL-CCNC: 60 IU/L (ref 39–117)
ALT SERPL-CCNC: 13 IU/L (ref 0–32)
AST SERPL-CCNC: 16 IU/L (ref 0–40)
BASOPHILS # BLD AUTO: 0 X10E3/UL (ref 0–0.2)
BASOPHILS NFR BLD AUTO: 0 %
BILIRUB SERPL-MCNC: 0.8 MG/DL (ref 0–1.2)
BUN SERPL-MCNC: 14 MG/DL (ref 8–27)
BUN/CREAT SERPL: 17 (ref 12–28)
CALCIUM SERPL-MCNC: 8.9 MG/DL (ref 8.7–10.3)
CHLORIDE SERPL-SCNC: 106 MMOL/L (ref 96–106)
CHOLEST SERPL-MCNC: 236 MG/DL (ref 100–199)
CO2 SERPL-SCNC: 25 MMOL/L (ref 18–29)
CREAT SERPL-MCNC: 0.82 MG/DL (ref 0.57–1)
EOSINOPHIL # BLD AUTO: 0.3 X10E3/UL (ref 0–0.4)
EOSINOPHIL NFR BLD AUTO: 4 %
ERYTHROCYTE [DISTWIDTH] IN BLOOD BY AUTOMATED COUNT: 14.3 % (ref 12.3–15.4)
GFR SERPLBLD CREATININE-BSD FMLA CKD-EPI: 68 ML/MIN/1.73
GFR SERPLBLD CREATININE-BSD FMLA CKD-EPI: 78 ML/MIN/1.73
GLOBULIN SER CALC-MCNC: 3.1 G/DL (ref 1.5–4.5)
GLUCOSE SERPL-MCNC: 96 MG/DL (ref 65–99)
HCT VFR BLD AUTO: 39.4 % (ref 34–46.6)
HDLC SERPL-MCNC: 62 MG/DL
HGB BLD-MCNC: 13 G/DL (ref 11.1–15.9)
IMM GRANULOCYTES # BLD: 0 X10E3/UL (ref 0–0.1)
IMM GRANULOCYTES NFR BLD: 0 %
INTERPRETATION, 910389: NORMAL
LDLC SERPL CALC-MCNC: 160 MG/DL (ref 0–99)
LYMPHOCYTES # BLD AUTO: 1.7 X10E3/UL (ref 0.7–3.1)
LYMPHOCYTES NFR BLD AUTO: 25 %
MCH RBC QN AUTO: 29.5 PG (ref 26.6–33)
MCHC RBC AUTO-ENTMCNC: 33 G/DL (ref 31.5–35.7)
MCV RBC AUTO: 89 FL (ref 79–97)
MONOCYTES # BLD AUTO: 0.3 X10E3/UL (ref 0.1–0.9)
MONOCYTES NFR BLD AUTO: 4 %
NEUTROPHILS # BLD AUTO: 4.5 X10E3/UL (ref 1.4–7)
NEUTROPHILS NFR BLD AUTO: 67 %
PLATELET # BLD AUTO: 283 X10E3/UL (ref 150–379)
POTASSIUM SERPL-SCNC: 4.4 MMOL/L (ref 3.5–5.2)
PROT SERPL-MCNC: 6.7 G/DL (ref 6–8.5)
RBC # BLD AUTO: 4.41 X10E6/UL (ref 3.77–5.28)
SODIUM SERPL-SCNC: 144 MMOL/L (ref 134–144)
TRIGL SERPL-MCNC: 71 MG/DL (ref 0–149)
VLDLC SERPL CALC-MCNC: 14 MG/DL (ref 5–40)
WBC # BLD AUTO: 6.8 X10E3/UL (ref 3.4–10.8)

## 2018-05-17 ENCOUNTER — HOSPITAL ENCOUNTER (OUTPATIENT)
Dept: MAMMOGRAPHY | Age: 78
Discharge: HOME | End: 2018-05-17
Attending: INTERNAL MEDICINE
Payer: MEDICARE

## 2018-05-17 DIAGNOSIS — Z78.0 POST-MENOPAUSE: ICD-10-CM

## 2018-05-17 DIAGNOSIS — Z13.820 SCREENING FOR OSTEOPOROSIS: ICD-10-CM

## 2018-05-17 PROCEDURE — 77080 DXA BONE DENSITY AXIAL: CPT

## 2018-05-29 ENCOUNTER — TELEPHONE (OUTPATIENT)
Dept: FAMILY MEDICINE CLINIC | Age: 78
End: 2018-05-29

## 2018-05-29 NOTE — TELEPHONE ENCOUNTER
Pt was advised of labs results and questioned about BDT results. Per Dr. Ros Ferrer, she was advised BDT was normal and to continue taking her calcium and Vitamin D for good bone support. Patient verbalized understanding and has no further questions at this time.

## 2018-09-21 ENCOUNTER — HOSPITAL ENCOUNTER (EMERGENCY)
Age: 78
Discharge: HOME | End: 2018-09-21
Attending: EMERGENCY MEDICINE
Payer: MEDICARE

## 2018-09-21 ENCOUNTER — APPOINTMENT (OUTPATIENT)
Dept: CT IMAGING | Age: 78
End: 2018-09-21
Attending: EMERGENCY MEDICINE
Payer: MEDICARE

## 2018-09-21 VITALS
OXYGEN SATURATION: 97 % | SYSTOLIC BLOOD PRESSURE: 175 MMHG | TEMPERATURE: 98.2 F | RESPIRATION RATE: 18 BRPM | HEIGHT: 68 IN | HEART RATE: 85 BPM | DIASTOLIC BLOOD PRESSURE: 76 MMHG | BODY MASS INDEX: 39.43 KG/M2 | WEIGHT: 260.14 LBS

## 2018-09-21 DIAGNOSIS — S09.90XA INJURY OF HEAD, INITIAL ENCOUNTER: Primary | ICD-10-CM

## 2018-09-21 DIAGNOSIS — S00.83XA FACIAL CONTUSION, INITIAL ENCOUNTER: ICD-10-CM

## 2018-09-21 PROCEDURE — 70450 CT HEAD/BRAIN W/O DYE: CPT

## 2018-09-21 PROCEDURE — 99282 EMERGENCY DEPT VISIT SF MDM: CPT

## 2018-09-21 NOTE — ED TRIAGE NOTES
Patient presents ambulatory to treatment area. Patient states she tripped and fell while at [employer] Wednesday night, striking her head on the door frame. Patient complains of left eye pain and swelling. Patient denies problems with her vision. Patient denies LOC at time of the fall.

## 2018-09-21 NOTE — ED NOTES
The patient was discharged home by Dr. Homa Beatty in stable condition. The patient is alert and oriented, in no respiratory distress. The patient's diagnosis, condition and treatment were explained. The patient expressed understanding. No prescriptions given. No work/school note given. A discharge plan has been developed. A  was not involved in the process. Aftercare instructions were given. Pt ambulatory out of the ED.

## 2018-09-21 NOTE — DISCHARGE INSTRUCTIONS
Head Injury: Care Instructions  Your Care Instructions    Most injuries to the head are minor. Bumps, cuts, and scrapes on the head and face usually heal well and can be treated the same as injuries to other parts of the body. Although it's rare, once in a while a more serious problem shows up after you are home. So it's good to be on the lookout for symptoms for a day or two. Follow-up care is a key part of your treatment and safety. Be sure to make and go to all appointments, and call your doctor if you are having problems. It's also a good idea to know your test results and keep a list of the medicines you take. How can you care for yourself at home? · Follow your doctor's instructions. He or she will tell you if you need someone to watch you closely for the next 24 hours or longer. · Take it easy for the next few days or more if you are not feeling well. · Ask your doctor when it's okay for you to go back to activities like driving a car, riding a bike, or operating machinery. When should you call for help? Call 911 anytime you think you may need emergency care. For example, call if:    · You have a seizure.     · You passed out (lost consciousness).     · You are confused or can't stay awake.    Call your doctor now or seek immediate medical care if:    · You have new or worse vomiting.     · You feel less alert.     · You have new weakness or numbness in any part of your body.    Watch closely for changes in your health, and be sure to contact your doctor if:    · You do not get better as expected.     · You have new symptoms, such as headaches, trouble concentrating, or changes in mood. Where can you learn more? Go to http://severino-luis alberto.info/. Enter K753 in the search box to learn more about \"Head Injury: Care Instructions. \"  Current as of: October 9, 2017  Content Version: 11.7  © 5921-1697 [employer].  Care instructions adapted under license by [employer] (which disclaims liability or warranty for this information). If you have questions about a medical condition or this instruction, always ask your healthcare professional. [address] any warranty or liability for your use of this information.

## 2018-09-21 NOTE — ED NOTES
Pt resting on the stretcher in no distress. All results available for review and awaiting further care mgmt. Will continue to monitor. Purposeful rounding completed. Toileting offered, ongoing plan of care discussed and pts concerns/questions addressed. Pain reassessed. Pt informed of time factors with lab/imaging study results. Pt resting on the stretcher in a position of comfort. Call bell within reach; will continue to monitor.

## 2018-09-21 NOTE — ED PROVIDER NOTES
Patient is a [de-identified] y.o. female presenting with fall and head injury. The history is provided by the patient. Fall The accident occurred 2 days ago. The fall occurred while walking. She fell from a height of ground level. She landed on hard floor. The point of impact was the head. She was ambulatory at the scene. Pertinent negatives include no loss of consciousness. The risk factors include being elderly. Head Injury The incident occurred 2 days ago. She came to the ER via walk-in. The injury mechanism was a fall.  
 swelling left forehead and around left eye Past Medical History:  
Diagnosis Date  Arthritis  Hypercholesterolemia Past Surgical History:  
Procedure Laterality Date  HX ORTHOPAEDIC    
 ankle Family History:  
Problem Relation Age of Onset Heaven Bertrand Mother 54  No Known Problems Father Social History Social History  Marital status:  Spouse name: N/A  
 Number of children: N/A  
 Years of education: N/A Occupational History  Not on file. Social History Main Topics  Smoking status: Never Smoker  Smokeless tobacco: Never Used  Alcohol use 0.0 oz/week  
  0 Standard drinks or equivalent per week Comment: occasional  
 Drug use: No  
 Sexual activity: No  
 
Other Topics Concern  Not on file Social History Narrative ALLERGIES: Latex Review of Systems Neurological: Negative for loss of consciousness. Vitals:  
 09/21/18 1225 BP: 175/76 Pulse: 85 Resp: 18 Temp: 98.2 °F (36.8 °C) SpO2: 97% Weight: 118 kg (260 lb 2.3 oz) Height: 5' 7.5\" (1.715 m) Physical Exam  
Constitutional: She is oriented to person, place, and time. She appears well-developed and well-nourished. HENT:  
Head: Normocephalic. Head is with contusion. Pulmonary/Chest: Effort normal. No respiratory distress. Neurological: She is alert and oriented to person, place, and time. Nursing note and vitals reviewed. MDM Number of Diagnoses or Management Options Diagnosis management comments: Contusion with hematoma of left forehead with swelling into the left eyelids Check head CT and if neg may d/c home Amount and/or Complexity of Data Reviewed Tests in the radiology section of CPT®: ordered and reviewed ED Course Procedures VITALS:  
Patient Vitals for the past 8 hrs: 
 Temp Pulse Resp BP SpO2  
09/21/18 1225 98.2 °F (36.8 °C) 85 18 175/76 97 % No results found for this or any previous visit (from the past 24 hour(s)). Ct Head Wo Cont Result Date: 9/21/2018 EXAM:  CT HEAD WO CONT INDICATION:   Head trauma, closed, mild, GCS >= 13, no risk factors, neuro exam normal COMPARISON: None. CONTRAST:  None. TECHNIQUE: Unenhanced CT of the head was performed using 5 mm images. Brain and bone windows were generated. CT dose reduction was achieved through use of a standardized protocol tailored for this examination and automatic exposure control for dose modulation. Adaptive statistical iterative reconstruction (ASIR) was utilized. FINDINGS: The ventricles and sulci are within normal limits. No intracranial hemorrhage mass or acute infarction identified. The bony structures appear intact. There is a soft tissue hematoma overlying left supraorbital frontal region IMPRESSION: No acute intracranial process identified.

## 2018-10-04 ENCOUNTER — OFFICE VISIT (OUTPATIENT)
Dept: FAMILY MEDICINE CLINIC | Age: 78
End: 2018-10-04

## 2018-10-04 VITALS
TEMPERATURE: 98.1 F | OXYGEN SATURATION: 97 % | RESPIRATION RATE: 20 BRPM | WEIGHT: 256 LBS | DIASTOLIC BLOOD PRESSURE: 75 MMHG | SYSTOLIC BLOOD PRESSURE: 144 MMHG | BODY MASS INDEX: 38.8 KG/M2 | HEART RATE: 84 BPM | HEIGHT: 68 IN

## 2018-10-04 DIAGNOSIS — H05.232 PERIORBITAL HEMATOMA OF LEFT EYE: Primary | ICD-10-CM

## 2018-10-04 DIAGNOSIS — E66.01 OBESITY, MORBID (HCC): ICD-10-CM

## 2018-10-04 RX ORDER — BRIMONIDINE TARTRATE 1 MG/ML
SOLUTION/ DROPS OPHTHALMIC
Refills: 1 | COMMUNITY
Start: 2018-08-01

## 2018-10-04 NOTE — PROGRESS NOTES
Chief Complaint: 
Chief Complaint Patient presents with  
[facility] Follow Up Follow up from ED visit at [facility] with head inury s/p fall History of Present Illness: 
She is a [de-identified] y.o. female who presents for follow up after a fall on 9/119/2018. She was seen in the ER at [facility]. She did have a fall where she hit her head and developed a hematoma over the L-eye. She did have a head CT-scan which showed: 
 
CT Results (most recent): 
 
Results from Hospital Encounter encounter on 09/21/18 CT HEAD WO CONT Narrative EXAM:  CT HEAD WO CONT INDICATION:   Head trauma, closed, mild, GCS >= 13, no risk factors, neuro exam 
normal 
 
COMPARISON: None. CONTRAST:  None. TECHNIQUE: Unenhanced CT of the head was performed using 5 mm images. Brain and 
bone windows were generated. CT dose reduction was achieved through use of a 
standardized protocol tailored for this examination and automatic exposure 
control for dose modulation. Adaptive statistical iterative reconstruction 
(ASIR) was utilized. FINDINGS: 
The ventricles and sulci are within normal limits. No intracranial hemorrhage 
mass or acute infarction identified. The bony structures appear intact. There is a soft tissue hematoma overlying left supraorbital frontal region Impression IMPRESSION: No acute intracranial process identified. Reviewed PmHx, RxHx, FmHx, SocHx, AllgHx and updated and dated in the chart. Patient Active Problem List  
 Diagnosis  Obesity, morbid ([address]  Vitamin D deficiency  Symptomatic menopausal or female climacteric states  Hypercholesteremia Review of Systems - negative except as listed above in the HPI Objective:  
 
Vitals:  
 10/04/18 1405 BP: 144/75 Pulse: 84 Resp: 20 Temp: 98.1 °F (36.7 °C) TempSrc: Oral  
SpO2: 97% Weight: 256 lb (116.1 kg) Height: 5' 7.5\" (1.715 m) Physical Examination: General appearance - alert, well appearing, and in no distress and overweight Mental status - alert, oriented to person, place, and time Eyes - pupils equal and reactive, extraocular eye movements intact Chest - clear to auscultation, no wheezes, rales or rhonchi, symmetric air entry Heart - normal rate, regular rhythm, normal S1, S2, no murmurs, rubs, clicks or gallops Back exam - full range of motion, no tenderness, palpable spasm or pain on motion Neurological - alert, oriented, normal speech, no focal findings or movement disorder noted Musculoskeletal - no joint tenderness, deformity or swelling Extremities - peripheral pulses normal, no pedal edema, no clubbing or cyanosis Skin - normal coloration and turgor, no rashes, no suspicious skin lesions noted Assessment/ Plan: ICD-10-CM ICD-9-CM 1. Periorbital hematoma of left eye H05.232 376.32   
2. Obesity, morbid ([address] E66.01 278.01   
 
78F s/p fall in September. She sustained a hematoma of the Left eye and this has significantly healed. Doing well and no other major concerns at this time. I have discussed the diagnosis with the patient and the intended treatment plan as seen in the above orders. The patient has received an after-visit summary and questions were answered concerning future plans. Asked to return should symptoms worsen or not improve with treatment. Any pending labs and studies will be relayed to patient when they become available. Pt verbalizes understanding of plan of care and denies further questions or concerns at this time. Follow-up Disposition: 
Return in about 1 month (around 11/4/2018), or if symptoms worsen or fail to improve, for FASTING LABS. Liliya Pleitez MD 
Patient Instructions Preventing Falls: Care Instructions Your Care Instructions Getting around your home safely can be a challenge if you have injuries or health problems that make it easy for you to fall.  Loose rugs and furniture in walkways are among the dangers for many older people who have problems walking or who have poor eyesight. People who have conditions such as arthritis, osteoporosis, or dementia also have to be careful not to fall. You can make your home safer with a few simple measures. Follow-up care is a key part of your treatment and safety. Be sure to make and go to all appointments, and call your doctor if you are having problems. It's also a good idea to know your test results and keep a list of the medicines you take. How can you care for yourself at home? Taking care of yourself · You may get dizzy if you do not drink enough water. To prevent dehydration, drink plenty of fluids, enough so that your urine is light yellow or clear like water. Choose water and other caffeine-free clear liquids. If you have kidney, heart, or liver disease and have to limit fluids, talk with your doctor before you increase the amount of fluids you drink. · Exercise regularly to improve your strength, muscle tone, and balance. Walk if you can. Swimming may be a good choice if you cannot walk easily. · Have your vision and hearing checked each year or any time you notice a change. If you have trouble seeing and hearing, you might not be able to avoid objects and could lose your balance. · Know the side effects of the medicines you take. Ask your doctor or pharmacist whether the medicines you take can affect your balance. Sleeping pills or sedatives can affect your balance. · Limit the amount of alcohol you drink. Alcohol can impair your balance and other senses. · Ask your doctor whether calluses or corns on your feet need to be removed. If you wear loose-fitting shoes because of calluses or corns, you can lose your balance and fall. · Talk to your doctor if you have numbness in your feet. Preventing falls at home · Remove raised doorway thresholds, throw rugs, and clutter.  Repair loose carpet or raised areas in the floor. · Move furniture and electrical cords to keep them out of walking paths. · Use nonskid floor wax, and wipe up spills right away, especially on ceramic tile floors. · If you use a walker or cane, put rubber tips on it. If you use crutches, clean the bottoms of them regularly with an abrasive pad, such as steel wool. · Keep your house well lit, especially [place] , and outside walkways. Use night-lights in areas such as hallways and bathrooms. Add extra light switches or use remote switches (such as switches that go on or off when you clap your hands) to make it easier to turn lights on if you have to get up during the night. · Install sturdy handrails on stairways. · Move items in your cabinets so that the things you use a lot are on the lower shelves (about waist level). · Keep a cordless phone and a flashlight with new batteries by your bed. If possible, put a phone in each of the main rooms of your house, or carry a cell phone in case you fall and cannot reach a phone. Or, you can wear a device around your neck or wrist. You push a button that sends a signal for help. · Wear low-heeled shoes that fit well and give your feet good support. Use footwear with nonskid soles. Check the heels and soles of your shoes for wear. Repair or replace worn heels or soles. · Do not wear socks without shoes on wood floors. · Walk on the grass when the sidewalks are slippery. If you live in an area that gets snow and ice in the winter, sprinkle salt on slippery steps and sidewalks. Preventing falls in the bath · Install grab bars and nonskid mats inside and outside your shower or tub and near the toilet and sinks. · Use shower chairs and bath benches. · Use a hand-held shower head that will allow you to sit while showering.  
· Get into a tub or shower by putting the weaker leg in first. Get out of a tub or shower with your strong side first. 
 · Repair loose toilet seats and consider installing a raised toilet seat to make getting on and off the toilet easier. · Keep your bathroom door unlocked while you are in the shower. Where can you learn more? Go to http://severino-luis alberto.info/. Enter [id number] [id number] in the search box to learn more about \"Preventing Falls: Care Instructions. \" Current as of: March 16, 2018 Content Version: 11.8 © 0108-5683 [employer]. Care instructions adapted under license by [employer] (which disclaims liability or warranty for this information). If you have questions about a medical condition or this instruction, always ask your healthcare professional. [place] [address] any warranty or liability for your use of this information. 13 Ways to Prevent Falls in the Hospital 
When you're in the hospital, your risk of falling may be higher than normal. Medicines can make you dizzy. Or illness and treatments may cause you to feel weak and confused, making it harder to move around. But there are ways you can prevent falls during your stay. Things you can do to prevent a fall at the hospital 
 Bring nonskid socks, slippers, or shoes that stay on your feet. If you do not have these, ask the nurse for a pair of nonskid socks. Bring your walker or cane, if you use one at home. Or ask the hospital to provide one during your stay. Ask your doctor or nurse if your treatments or medicines will increase your risk of a fall. Some hospitals will check your risk when you are admitted to the hospital.  
 Tell your doctor or nurse if medicines make you dizzy, weak, or lightheaded. If you feel this way, do not try to get up on your own. Call the nurse for help. Call the nurse for help getting out of bed if you are on crutches or have trouble walking. Do not try to do it on your own until the nurse says it's ok and you feel sure you are able. When your nurse says it's ok, get up slowly. Sit up first and count to 10 before you get out of bed. Put on your eyeglasses before you get out of bed, if you wear them. If you need help getting to the bathroom, call the nurse before you have an urgent need to go. If you get fluids through a vein (IV), you may need to go to the bathroom more often. Things the hospital staff can do to prevent a fall Keep needed items close by. Your phone, the nurse call light or button, and anything you need to help you walk should be close to you. Keep your bed low and locked. Your bed should be low enough so that you don't have problems getting out of bed. The wheels on your bed should be locked so the bed can't move. Explain what is safe. Your nurse or doctor will tell you what you can safely do and how often you need to get up and move around. Keep your room neat. Your room should not have any wet or slippery areas. There should be nothing in the way of going to the bathroom or hallway. Light up the room. Your room should have good lighting. Make sure there is a night light in your bathroom. Current as of: Karine 3, 2018 Content Version: 11.8 © 3101-6035 [employer]. Care instructions adapted under license by [employer] (which disclaims liability or warranty for this information). If you have questions about a medical condition or this instruction, always ask your healthcare professional. [place] [address] any warranty or liability for your use of this information.

## 2018-10-04 NOTE — PROGRESS NOTES
Identified pt with two pt identifiers(name and ). Chief Complaint Patient presents with  
[facility] Follow Up Follow up from ED visit at [facility] with head inury s/p fall Health Maintenance Due Topic  Shingrix Vaccine Age 50> (1 of 2)  GLAUCOMA SCREENING Q2Y  Pneumococcal 65+ Low/Medium Risk (2 of 2 - PPSV23)  Influenza Age 5 to Adult Wt Readings from Last 3 Encounters:  
18 260 lb 2.3 oz (118 kg) 18 261 lb 6.4 oz (118.6 kg) 17 261 lb 6.4 oz (118.6 kg) Temp Readings from Last 3 Encounters:  
18 98.2 °F (36.8 °C)  
18 98.2 °F (36.8 °C) (Oral) 17 98.5 °F (36.9 °C) (Oral) BP Readings from Last 3 Encounters:  
18 175/76  
18 122/60  
17 129/63 Pulse Readings from Last 3 Encounters:  
18 85  
18 77  
17 75 Learning Assessment: 
:  
 
Learning Assessment 2015 3/11/2014 PRIMARY LEARNER Patient Patient HIGHEST LEVEL OF EDUCATION - PRIMARY LEARNER  GRADUATED HIGH SCHOOL OR GED GRADUATED HIGH SCHOOL OR GED  
BARRIERS PRIMARY LEARNER NONE NONE  
CO-LEARNER CAREGIVER No No  
PRIMARY LANGUAGE ENGLISH ENGLISH  
LEARNER PREFERENCE PRIMARY LISTENING READING  
  - VIDEOS  
  - DEMONSTRATION  
ANSWERED BY Patient patient RELATIONSHIP SELF SELF Depression Screening: 
:  
 
PHQ over the last two weeks 10/4/2018 Little interest or pleasure in doing things Not at all Feeling down, depressed, irritable, or hopeless Not at all Total Score PHQ 2 0 Fall Risk Assessment: 
:  
 
Fall Risk Assessment, last 12 mths 10/4/2018 Able to walk? Yes Fall in past 12 months? Yes Fall with injury? Yes  
Number of falls in past 12 months 1 Fall Risk Score 2 Abuse Screening: 
:  
 
Abuse Screening Questionnaire 2017 10/20/2016 10/14/2014 3/11/2014 Do you ever feel afraid of your partner? N N N N Are you in a relationship with someone who physically or mentally threatens you? N N N N Is it safe for you to go home? [place] Coordination of Care Questionnaire: 
:  
 
1) Have you been to an emergency room, urgent care clinic since your last visit? yes Hospitalized since your last visit? no          
 
2) Have you seen or consulted any other health care providers outside of [address] [place] [address] since your last visit? no  (Include any pap smears or colon screenings in this section.) 3) Do you have an Advance Directive on file? no 
Are you interested in receiving information about Advance Directives? no 
 
Reviewed record in preparation for visit and have obtained necessary documentation. Medication reconciliation up to date and corrected with patient at this time.

## 2018-10-04 NOTE — PATIENT INSTRUCTIONS
Preventing Falls: Care Instructions Your Care Instructions Getting around your home safely can be a challenge if you have injuries or health problems that make it easy for you to fall. Loose rugs and furniture in walkways are among the dangers for many older people who have problems walking or who have poor eyesight. People who have conditions such as arthritis, osteoporosis, or dementia also have to be careful not to fall. You can make your home safer with a few simple measures. Follow-up care is a key part of your treatment and safety. Be sure to make and go to all appointments, and call your doctor if you are having problems. It's also a good idea to know your test results and keep a list of the medicines you take. How can you care for yourself at home? Taking care of yourself · You may get dizzy if you do not drink enough water. To prevent dehydration, drink plenty of fluids, enough so that your urine is light yellow or clear like water. Choose water and other caffeine-free clear liquids. If you have kidney, heart, or liver disease and have to limit fluids, talk with your doctor before you increase the amount of fluids you drink. · Exercise regularly to improve your strength, muscle tone, and balance. Walk if you can. Swimming may be a good choice if you cannot walk easily. · Have your vision and hearing checked each year or any time you notice a change. If you have trouble seeing and hearing, you might not be able to avoid objects and could lose your balance. · Know the side effects of the medicines you take. Ask your doctor or pharmacist whether the medicines you take can affect your balance. Sleeping pills or sedatives can affect your balance. · Limit the amount of alcohol you drink. Alcohol can impair your balance and other senses. · Ask your doctor whether calluses or corns on your feet need to be removed.  If you wear loose-fitting shoes because of calluses or corns, you can lose your balance and fall. · Talk to your doctor if you have numbness in your feet. Preventing falls at home · Remove raised doorway thresholds, throw rugs, and clutter. Repair loose carpet or raised areas in the floor. · Move furniture and electrical cords to keep them out of walking paths. · Use nonskid floor wax, and wipe up spills right away, especially on ceramic tile floors. · If you use a walker or cane, put rubber tips on it. If you use crutches, clean the bottoms of them regularly with an abrasive pad, such as steel wool. · Keep your house well lit, especially [place], and outside walkways. Use night-lights in areas such as hallways and bathrooms. Add extra light switches or use remote switches (such as switches that go on or off when you clap your hands) to make it easier to turn lights on if you have to get up during the night. · Install sturdy handrails on stairways. · Move items in your cabinets so that the things you use a lot are on the lower shelves (about waist level). · Keep a cordless phone and a flashlight with new batteries by your bed. If possible, put a phone in each of the main rooms of your house, or carry a cell phone in case you fall and cannot reach a phone. Or, you can wear a device around your neck or wrist. You push a button that sends a signal for help. · Wear low-heeled shoes that fit well and give your feet good support. Use footwear with nonskid soles. Check the heels and soles of your shoes for wear. Repair or replace worn heels or soles. · Do not wear socks without shoes on wood floors. · Walk on the grass when the sidewalks are slippery. If you live in an area that gets snow and ice in the winter, sprinkle salt on slippery steps and sidewalks. Preventing falls in the bath · Install grab bars and nonskid mats inside and outside your shower or tub and near the toilet and sinks. · Use shower chairs and bath benches. · Use a hand-held shower head that will allow you to sit while showering. · Get into a tub or shower by putting the weaker leg in first. Get out of a tub or shower with your strong side first. 
· Repair loose toilet seats and consider installing a raised toilet seat to make getting on and off the toilet easier. · Keep your bathroom door unlocked while you are in the shower. Where can you learn more? Go to http://severino-luis alberto.info/. Enter [id number] [id number] in the search box to learn more about \"Preventing Falls: Care Instructions. \" Current as of: March 16, 2018 Content Version: 11.8 © 7124-1601 [employer]inja. Care instructions adapted under license by [facility] (which disclaims liability or warranty for this information). If you have questions about a medical condition or this instruction, always ask your healthcare professional. [place] [address] any warranty or liability for your use of this information. 13 Ways to Prevent Falls in the Hospital 
When you're in the hospital, your risk of falling may be higher than normal. Medicines can make you dizzy. Or illness and treatments may cause you to feel weak and confused, making it harder to move around. But there are ways you can prevent falls during your stay. Things you can do to prevent a fall at the hospital 
 Bring nonskid socks, slippers, or shoes that stay on your feet. If you do not have these, ask the nurse for a pair of nonskid socks. Bring your walker or cane, if you use one at home. Or ask the hospital to provide one during your stay. Ask your doctor or nurse if your treatments or medicines will increase your risk of a fall. Some hospitals will check your risk when you are admitted to the hospital.  
 Tell your doctor or nurse if medicines make you dizzy, weak, or lightheaded. If you feel this way, do not try to get up on your own. Call the nurse for help. Call the nurse for help getting out of bed if you are on crutches or have trouble walking. Do not try to do it on your own until the nurse says it's ok and you feel sure you are able. When your nurse says it's ok, get up slowly. Sit up first and count to 10 before you get out of bed. Put on your eyeglasses before you get out of bed, if you wear them. If you need help getting to the bathroom, call the nurse before you have an urgent need to go. If you get fluids through a vein (IV), you may need to go to the bathroom more often. Things the hospital staff can do to prevent a fall Keep needed items close by. Your phone, the nurse call light or button, and anything you need to help you walk should be close to you. Keep your bed low and locked. Your bed should be low enough so that you don't have problems getting out of bed. The wheels on your bed should be locked so the bed can't move. Explain what is safe. Your nurse or doctor will tell you what you can safely do and how often you need to get up and move around. Keep your room neat. Your room should not have any wet or slippery areas. There should be nothing in the way of going to the bathroom or hallway. Light up the room. Your room should have good lighting. Make sure there is a night light in your bathroom. Current as of: Karine 3, 2018 Content Version: 11.8 © 4424-7066 Healthwise, Incorporated. Care instructions adapted under license by [employer] (which disclaims liability or warranty for this information). If you have questions about a medical condition or this instruction, always ask your healthcare professional. [address] any warranty or liability for your use of this information.

## 2018-10-04 NOTE — MR AVS SNAPSHOT
[address] [place] [address] [address] [facility] 
[phone] Patient: Abel Swann MRN: [id number] SIT:5/38/[birth year]08 Visit Information Date & Time Provider Department Dept. Phone Encounter #  
 10/4/2018  1:45 PM Israel Dyson Travis [phone] [id number] Follow-up Instructions Return in about 1 month (around 11/4/2018), or if symptoms worsen or fail to improve, for FASTING LABS. Upcoming Health Maintenance Date Due Shingrix Vaccine Age 50> (1 of 2) 3/18/1988 GLAUCOMA SCREENING Q2Y 3/18/2003 Pneumococcal 65+ Low/Medium Risk (2 of 2 - PPSV23) 10/20/2017 Influenza Age 5 to Adult 8/1/2018 DTaP/Tdap/Td series (2 - Td) 10/20/2026 Allergies as of 10/4/2018  Review Complete On: 10/4/2018 By: Brandi Avina MD  
  
 Severity Noted Reaction Type Reactions Latex  07/11/2017    Other (comments) Patient is allergic to the powder in the gloves. Itchy eyes and rash. Current Immunizations  Reviewed on 10/4/2018 Name Date Td, Adsorbed PF 6/30/2015 10:35 AM  
  
 Reviewed by Brandi Avina MD on 10/4/2018 at  2:29 PM  
You Were Diagnosed With   
  
 Codes Comments Periorbital hematoma of left eye    -  Primary ICD-10-CM: P25.081 ICD-9-CM: 376.32 Ob[facility], morbid [facility]     ICD-10-CM: E66.01 
ICD-9-CM: 278.01 Vitals BP Pulse Temp Resp Height(growth percentile) Weight(growth percentile) 144/75 (BP 1 Location: Right arm, BP Patient Position: Sitting) 84 98.1 °F (36.7 °C) (Oral) 20 5' 7.5\" (1.715 m) 256 lb (116.1 kg) SpO2 BMI OB Status Smoking Status 97% 39.5 kg/m2 Premenopausal Never Smoker BMI and BSA Data Body Mass Index Body Surface Area  
 39.5 kg/m 2 2.35 m 2 Preferred Pharmacy Pharmacy Name Phone [id number] [phone] Your Updated Medication List  
  
   
 This list is accurate as of 10/4/18  2:41 PM.  Always use your most recent med list.  
  
  
  
  
 ALPHAGAN P 0.1 % ophthalmic solution Generic drug:  brimonidine PLACE 1 DROP IN BOTH EYES TWICE A DAY DISPENSE 15ML BOTTLE  
  
 calcium carbonate 200 mg calcium (500 mg) Chew Commonly known as:  TUMS Take 1 Tab by mouth daily. cyanocobalamin 1,000 mcg sublingual tablet Commonly known as:  VITAMIN B-12 Take 1 tablet by mouth daily. pyridoxine (vitamin B6) 100 mg tablet Commonly known as:  VITAMIN B-6 Take 1 Tab by mouth daily. Follow-up Instructions Return in about 1 month (around 11/4/2018), or if symptoms worsen or fail to improve, for FASTING LABS. Patient Instructions Preventing Falls: Care Instructions Your Care Instructions Getting around your home safely can be a challenge if you have injuries or health problems that make it easy for you to fall. Loose rugs and furniture in walkways are among the dangers for many older people who have problems walking or who have poor eyesight. People who have conditions such as arthritis, osteoporosis, or dementia also have to be careful not to fall. You can make your home safer with a few simple measures. Follow-up care is a key part of your treatment and safety. Be sure to make and go to all appointments, and call your doctor if you are having problems. It's also a good idea to know your test results and keep a list of the medicines you take. How can you care for yourself at home? Taking care of yourself · You may get dizzy if you do not drink enough water. To prevent dehydration, drink plenty of fluids, enough so that your urine is light yellow or clear like water. Choose water and other caffeine-free clear liquids. If you have kidney, heart, or liver disease and have to limit fluids, talk with your doctor before you increase the amount of fluids you drink. · Exercise regularly to improve your strength, muscle tone, and balance. Walk if you can. Swimming may be a good choice if you cannot walk easily. · Have your vision and hearing checked each year or any time you notice a change. If you have trouble seeing and hearing, you might not be able to avoid objects and could lose your balance. · Know the side effects of the medicines you take. Ask your doctor or pharmacist whether the medicines you take can affect your balance. Sleeping pills or sedatives can affect your balance. · Limit the amount of alcohol you drink. Alcohol can impair your balance and other senses. · Ask your doctor whether calluses or corns on your feet need to be removed. If you wear loose-fitting shoes because of calluses or corns, you can lose your balance and fall. · Talk to your doctor if you have numbness in your feet. Preventing falls at home · Remove raised doorway thresholds, throw rugs, and clutter. Repair loose carpet or raised areas in the floor. · Move furniture and electrical cords to keep them out of walking paths. · Use nonskid floor wax, and wipe up spills right away, especially on ceramic tile floors. · If you use a walker or cane, put rubber tips on it. If you use crutches, clean the bottoms of them regularly with an abrasive pad, such as steel wool. · Keep your house well lit, especially [place], and outside walkways. Use night-lights in areas such as hallways and bathrooms. Add extra light switches or use remote switches (such as switches that go on or off when you clap your hands) to make it easier to turn lights on if you have to get up during the night. · Install sturdy handrails on stairways. · Move items in your cabinets so that the things you use a lot are on the lower shelves (about waist level). · Keep a cordless phone and a flashlight with new batteries by your bed.  If possible, put a phone in each of the main rooms of your house, or carry a cell phone in case you fall and cannot reach a phone. Or, you can wear a device around your neck or wrist. You push a button that sends a signal for help. · Wear low-heeled shoes that fit well and give your feet good support. Use footwear with nonskid soles. Check the heels and soles of your shoes for wear. Repair or replace worn heels or soles. · Do not wear socks without shoes on wood floors. · Walk on the grass when the sidewalks are slippery. If you live in an area that gets snow and ice in the winter, sprinkle salt on slippery steps and sidewalks. Preventing falls in the bath · Install grab bars and nonskid mats inside and outside your shower or tub and near the toilet and sinks. · Use shower chairs and bath benches. · Use a hand-held shower head that will allow you to sit while showering. · Get into a tub or shower by putting the weaker leg in first. Get out of a tub or shower with your strong side first. 
· Repair loose toilet seats and consider installing a raised toilet seat to make getting on and off the toilet easier. · Keep your bathroom door unlocked while you are in the shower. Where can you learn more? Go to http://severino-luis alberto.info/. Enter [id number] [id number] in the search box to learn more about \"Preventing Falls: Care Instructions. \" Current as of: March 16, 2018 Content Version: 11.8 © 4505-4306 Synchro. Care instructions adapted under license by [employer] (which disclaims liability or warranty for this information). If you have questions about a medical condition or this instruction, always ask your healthcare professional. [place] [address] any warranty or liability for your use of this information. 13 Ways to Prevent Falls in the Hospital 
When you're in the hospital, your risk of falling may be higher than normal. Medicines can make you dizzy.  Or illness and treatments may cause you to feel weak and confused, making it harder to move around. But there are ways you can prevent falls during your stay. Things you can do to prevent a fall at the hospital 
 Bring nonskid socks, slippers, or shoes that stay on your feet. If you do not have these, ask the nurse for a pair of nonskid socks. Bring your walker or cane, if you use one at home. Or ask the hospital to provide one during your stay. Ask your doctor or nurse if your treatments or medicines will increase your risk of a fall. Some hospitals will check your risk when you are admitted to the hospital.  
 Tell your doctor or nurse if medicines make you dizzy, weak, or lightheaded. If you feel this way, do not try to get up on your own. Call the nurse for help. Call the nurse for help getting out of bed if you are on crutches or have trouble walking. Do not try to do it on your own until the nurse says it's ok and you feel sure you are able. When your nurse says it's ok, get up slowly. Sit up first and count to 10 before you get out of bed. Put on your eyeglasses before you get out of bed, if you wear them. If you need help getting to the bathroom, call the nurse before you have an urgent need to go. If you get fluids through a vein (IV), you may need to go to the bathroom more often. Things the hospital staff can do to prevent a fall Keep needed items close by. Your phone, the nurse call light or button, and anything you need to help you walk should be close to you. Keep your bed low and locked. Your bed should be low enough so that you don't have problems getting out of bed. The wheels on your bed should be locked so the bed can't move. Explain what is safe. Your nurse or doctor will tell you what you can safely do and how often you need to get up and move around. Keep your room neat. Your room should not have any wet or slippery areas. There should be nothing in the way of going to the bathroom or hallway. Light up the room. Your room should have good lighting. Make sure there is a night light in your bathroom. Current as of: Karine 3, 2018 Content Version: 11.8 © 1251-6247 Healthwise, Incorporated. Care instructions adapted under license by [employer] (which disclaims liability or warranty for this information). If you have questions about a medical condition or this instruction, always ask your healthcare professional. [place] [address] any warranty or liability for your use of this information. Introducing [facility]! [employer] introduces Liquid Scenarios patient portal. Now you can access parts of your medical record, email your doctor's office, and request medication refills online. 1. In your internet browser, go to [email or web address] 2. Click on the First Time User? Click Here link in the Sign In box. You will see the New Member Sign Up page. 3. Enter your Liquid Scenarios Access Code exactly as it appears below. You will not need to use this code after youve completed the sign-up process. If you do not sign up before the expiration date, you must request a new code. · Liquid Scenarios Access Code: [email or web address] Expires: 12/20/2018 12:21 PM 
 
4. Enter the last four digits of your Social Security Number (xxxx) and Date of Birth (mm/dd/yyyy) as indicated and click Submit. You will be taken to the next sign-up page. 5. Create a Liquid Scenarios ID. This will be your Liquid Scenarios login ID and cannot be changed, so think of one that is secure and easy to remember. 6. Create a [employer] Scenarios password. You can change your password at any time. 7. Enter your Password Reset Question and Answer. This can be used at a later time if you forget your password. 8. Enter your e-mail address. You will receive e-mail notification when new information is available in [address] 9. Click Sign Up. You can now view and download portions of your medical record. 10. Click the Download Summary menu link to download a portable copy of your medical information. If you have questions, please visit the Frequently Asked Questions section of the ScanSocial website. Remember, ScanSocial is NOT to be used for urgent needs. For medical emergencies, dial 911. Now available from your iPhone and Android! Please provide this summary of care documentation to your next provider. Your primary care clinician is listed as [address]. If you have any questions after today's visit, please call [phone].

## 2018-10-04 NOTE — ASSESSMENT & PLAN NOTE
Stable, based on history, physical exam and review of pertinent labs, studies and medications; meds reconciled; continue current treatment plan. Key Obesity Meds Patient is on no anti-obesity meds. Lab Results Component Value Date/Time  Glucose 96 05/08/2018 12:00 PM  
 Cholesterol, total 236 05/08/2018 12:00 PM  
 HDL Cholesterol 62 05/08/2018 12:00 PM  
 LDL, calculated 160 05/08/2018 12:00 PM  
 Triglyceride 71 05/08/2018 12:00 PM  
 Sodium 144 05/08/2018 12:00 PM  
 Potassium 4.4 05/08/2018 12:00 PM  
 ALT (SGPT) 13 05/08/2018 12:00 PM  
 AST (SGOT) 16 05/08/2018 12:00 PM  
 VITAMIN D, 25-HYDROXY 23.2 05/08/2018 12:00 PM

## 2018-11-12 ENCOUNTER — TELEPHONE (OUTPATIENT)
Dept: FAMILY MEDICINE CLINIC | Age: 78
End: 2018-11-12

## 2018-11-12 DIAGNOSIS — E78.00 HYPERCHOLESTEREMIA: Primary | ICD-10-CM

## 2018-11-12 DIAGNOSIS — E55.9 VITAMIN D DEFICIENCY: ICD-10-CM

## 2018-11-12 NOTE — TELEPHONE ENCOUNTER
The pt is calling because she is wanting to know if  can put her labs in so that she can come do a walk in for labs. I told her I would pass the message along to her and we would let her know when they are in.

## 2018-12-04 LAB
25(OH)D3+25(OH)D2 SERPL-MCNC: 22.8 NG/ML (ref 30–100)
ALBUMIN SERPL-MCNC: 3.8 G/DL (ref 3.5–4.7)
ALBUMIN/GLOB SERPL: 1.2 {RATIO} (ref 1.2–2.2)
ALP SERPL-CCNC: 69 IU/L (ref 39–117)
ALT SERPL-CCNC: 13 IU/L (ref 0–32)
AST SERPL-CCNC: 16 IU/L (ref 0–40)
BASOPHILS # BLD AUTO: 0 X10E3/UL (ref 0–0.2)
BASOPHILS NFR BLD AUTO: 0 %
BILIRUB SERPL-MCNC: 0.7 MG/DL (ref 0–1.2)
BUN SERPL-MCNC: 16 MG/DL (ref 8–27)
BUN/CREAT SERPL: 20 (ref 12–28)
CALCIUM SERPL-MCNC: 9.3 MG/DL (ref 8.7–10.3)
CHLORIDE SERPL-SCNC: 103 MMOL/L (ref 96–106)
CHOLEST SERPL-MCNC: 236 MG/DL (ref 100–199)
CO2 SERPL-SCNC: 23 MMOL/L (ref 20–29)
CREAT SERPL-MCNC: 0.81 MG/DL (ref 0.57–1)
CRP SERPL HS-MCNC: 7.16 MG/L (ref 0–3)
EOSINOPHIL # BLD AUTO: 0.5 X10E3/UL (ref 0–0.4)
EOSINOPHIL NFR BLD AUTO: 6 %
ERYTHROCYTE [DISTWIDTH] IN BLOOD BY AUTOMATED COUNT: 14.3 % (ref 12.3–15.4)
GLOBULIN SER CALC-MCNC: 3.2 G/DL (ref 1.5–4.5)
GLUCOSE SERPL-MCNC: 92 MG/DL (ref 65–99)
HCT VFR BLD AUTO: 38.7 % (ref 34–46.6)
HDLC SERPL-MCNC: 67 MG/DL
HGB BLD-MCNC: 12.7 G/DL (ref 11.1–15.9)
IMM GRANULOCYTES # BLD: 0 X10E3/UL (ref 0–0.1)
IMM GRANULOCYTES NFR BLD: 0 %
INTERPRETATION, 910389: NORMAL
LDLC SERPL CALC-MCNC: 154 MG/DL (ref 0–99)
LYMPHOCYTES # BLD AUTO: 1.8 X10E3/UL (ref 0.7–3.1)
LYMPHOCYTES NFR BLD AUTO: 23 %
MCH RBC QN AUTO: 28.9 PG (ref 26.6–33)
MCHC RBC AUTO-ENTMCNC: 32.8 G/DL (ref 31.5–35.7)
MCV RBC AUTO: 88 FL (ref 79–97)
MONOCYTES # BLD AUTO: 0.3 X10E3/UL (ref 0.1–0.9)
MONOCYTES NFR BLD AUTO: 4 %
NEUTROPHILS # BLD AUTO: 5.2 X10E3/UL (ref 1.4–7)
NEUTROPHILS NFR BLD AUTO: 67 %
PLATELET # BLD AUTO: 338 X10E3/UL (ref 150–379)
POTASSIUM SERPL-SCNC: 4.8 MMOL/L (ref 3.5–5.2)
PROT SERPL-MCNC: 7 G/DL (ref 6–8.5)
RBC # BLD AUTO: 4.39 X10E6/UL (ref 3.77–5.28)
SODIUM SERPL-SCNC: 141 MMOL/L (ref 134–144)
TRIGL SERPL-MCNC: 77 MG/DL (ref 0–149)
VLDLC SERPL CALC-MCNC: 15 MG/DL (ref 5–40)
WBC # BLD AUTO: 7.9 X10E3/UL (ref 3.4–10.8)

## 2019-02-05 ENCOUNTER — HOSPITAL ENCOUNTER (OUTPATIENT)
Dept: MAMMOGRAPHY | Age: 79
Discharge: HOME | End: 2019-02-05
Attending: INTERNAL MEDICINE
Payer: MEDICARE

## 2019-02-05 DIAGNOSIS — Z12.39 SCREENING BREAST EXAMINATION: ICD-10-CM

## 2019-02-05 PROCEDURE — 77067 SCR MAMMO BI INCL CAD: CPT

## 2019-02-13 ENCOUNTER — HOSPITAL ENCOUNTER (OUTPATIENT)
Dept: GENERAL RADIOLOGY | Age: 79
Discharge: HOME | End: 2019-02-13
Attending: INTERNAL MEDICINE
Payer: MEDICARE

## 2019-02-13 ENCOUNTER — OFFICE VISIT (OUTPATIENT)
Dept: FAMILY MEDICINE CLINIC | Age: 79
End: 2019-02-13

## 2019-02-13 VITALS
SYSTOLIC BLOOD PRESSURE: 122 MMHG | HEIGHT: 68 IN | TEMPERATURE: 97.6 F | WEIGHT: 248 LBS | BODY MASS INDEX: 37.59 KG/M2 | HEART RATE: 68 BPM | OXYGEN SATURATION: 97 % | RESPIRATION RATE: 20 BRPM | DIASTOLIC BLOOD PRESSURE: 66 MMHG

## 2019-02-13 DIAGNOSIS — G89.29 CHRONIC PAIN OF RIGHT KNEE: ICD-10-CM

## 2019-02-13 DIAGNOSIS — M25.561 CHRONIC PAIN OF RIGHT KNEE: ICD-10-CM

## 2019-02-13 DIAGNOSIS — G89.29 CHRONIC PAIN OF RIGHT KNEE: Primary | ICD-10-CM

## 2019-02-13 DIAGNOSIS — M25.561 CHRONIC PAIN OF RIGHT KNEE: Primary | ICD-10-CM

## 2019-02-13 PROCEDURE — 73562 X-RAY EXAM OF KNEE 3: CPT

## 2019-02-13 RX ORDER — CHOLECALCIFEROL (VITAMIN D3) 125 MCG
CAPSULE ORAL
COMMUNITY

## 2019-02-13 NOTE — PROGRESS NOTES
Chief Complaint: 
Chief Complaint Patient presents with  Leg Pain Complains of worsening pain that starts at right knee and radiates into rt. hip History of Present Illness: 
She is a [de-identified] y.o. female who presents with c/o worsening right knee pain. She has a h/o chronic right knee pain. She unfortunately, had a fall in October 2018. Since then, the pain has been worse on the knee. In the past, she was seen by Dr. Primo Morales for this, but since then, it is getting worse. Pain is 7/10. Reviewed PmHx, RxHx, FmHx, SocHx, AllgHx and updated and dated in the chart. Patient Active Problem List  
 Diagnosis  Obesity, morbid ([facility] [address]  Vitamin D deficiency  Symptomatic menopausal or female climacteric states  Hypercholesteremia Review of Systems - negative except as listed above in the HPI Objective:  
 
Vitals:  
 02/13/19 1148 BP: 122/66 Pulse: 68 Resp: 20 Temp: 97.6 °F (36.4 °C) TempSrc: Oral  
SpO2: 97% Weight: 248 lb (112.5 kg) Height: 5' 7.5\" (1.715 m) Physical Examination:  
General appearance - alert, well appearing, and in no distress and overweight Mental status - alert, oriented to person, place, and time Chest - clear to auscultation, no wheezes, rales or rhonchi, symmetric air entry Heart - normal rate, regular rhythm, normal S1, S2, no murmurs, rubs, clicks or gallops Knee exam: the injured knee reveals normal exam, no swelling, tenderness, instability; ligaments intact, FROM, antalgic gait, effusion, reduced range of motion, exam limited by acuity of pain, negative drawer sign, collateral ligaments intact. X-ray is negative for fracture. Assessment/ Plan:  
Diagnoses and all orders for this visit: 
 
1. Chronic pain of right knee -     XR KNEE RT MAX 2 VWS; Future - Tylenol for pain - Will refer to orthopedics as needed.   
 
I have discussed the diagnosis with the patient and the intended treatment plan as seen in the above orders. The patient has received an after-visit summary and questions were answered concerning future plans. Asked to return should symptoms worsen or not improve with treatment. Any pending labs and studies will be relayed to patient when they become available. Pt verbalizes understanding of plan of care and denies further questions or concerns at this time. Follow-up Disposition: 
Return if symptoms worsen or fail to improve.  
 
Milagro Clarke MD

## 2019-02-13 NOTE — PROGRESS NOTES
Identified pt with two pt identifiers(name and ). Chief Complaint Patient presents with  Leg Pain Complains of worsening pain that starts at right knee and radiates into rt. hip Health Maintenance Due Topic  Shingrix Vaccine Age 50> (1 of 2)  GLAUCOMA SCREENING Q2Y  Pneumococcal 65+ Low/Medium Risk (2 of 2 - PPSV23)  Influenza Age 5 to Adult Wt Readings from Last 3 Encounters:  
10/04/18 256 lb (116.1 kg) 18 260 lb 2.3 oz (118 kg) 18 261 lb 6.4 oz (118.6 kg) Temp Readings from Last 3 Encounters:  
10/04/18 98.1 °F (36.7 °C) (Oral) 18 98.2 °F (36.8 °C)  
18 98.2 °F (36.8 °C) (Oral) BP Readings from Last 3 Encounters:  
10/04/18 144/75  
18 175/76  
18 122/60 Pulse Readings from Last 3 Encounters:  
10/04/18 84  
18 85  
18 77 Learning Assessment: 
:  
 
Learning Assessment 2015 3/11/2014 PRIMARY LEARNER Patient Patient HIGHEST LEVEL OF EDUCATION - PRIMARY LEARNER  GRADUATED HIGH SCHOOL OR GED GRADUATED HIGH SCHOOL OR GED  
BARRIERS PRIMARY LEARNER NONE NONE  
CO-LEARNER CAREGIVER No No  
PRIMARY LANGUAGE ENGLISH ENGLISH  
LEARNER PREFERENCE PRIMARY LISTENING READING  
  - VIDEOS  
  - DEMONSTRATION  
ANSWERED BY Patient patient RELATIONSHIP SELF SELF Depression Screening: 
:  
 
3 most recent PHQ Screens 2019 Little interest or pleasure in doing things Not at all Feeling down, depressed, irritable, or hopeless Not at all Total Score PHQ 2 0 Fall Risk Assessment: 
:  
 
Fall Risk Assessment, last 12 mths 2019 Able to walk? Yes Fall in past 12 months? Yes Fall with injury? -  
Number of falls in past 12 months 1 Fall Risk Score -  
 
 
Abuse Screening: 
:  
 
Abuse Screening Questionnaire 2017 10/20/2016 10/14/2014 3/11/2014 Do you ever feel afraid of your partner? N N N N Are you in a relationship with someone who physically or mentally threatens you? N N N N Is it safe for you to go home? Mahsa Pradhan Coordination of Care Questionnaire: 
:  
 
1) Have you been to an emergency room, urgent care clinic since your last visit? no  
Hospitalized since your last visit? no          
 
2) Have you seen or consulted any other health care providers outside of [address] [place] [address] since your last visit? yes  (Include any pap smears or colon screenings in this section.) 3) Do you have an Advance Directive on file? no 
Are you interested in receiving information about Advance Directives? no 
 
Reviewed record in preparation for visit and have obtained necessary documentation. Medication reconciliation up to date and corrected with patient at this time.

## 2019-02-13 NOTE — PROGRESS NOTES
IMPRESSION:   1. No fracture. 2. At least moderate increased tricompartmental osteoarthritis  3. Would recommend that she see Dr. Tom Carbajal at this time - Ortho. I know that she saw Jeny Ceballos in the past. It is up to her. He is located at [facility].

## 2019-02-14 NOTE — PROGRESS NOTES
Spoke with patient and provided Dr. Ulices Rankin contact information as requested. Patient will call for an appointment.

## 2019-03-26 ENCOUNTER — OFFICE VISIT (OUTPATIENT)
Dept: FAMILY MEDICINE CLINIC | Age: 79
End: 2019-03-26

## 2019-03-26 VITALS
DIASTOLIC BLOOD PRESSURE: 70 MMHG | HEART RATE: 68 BPM | WEIGHT: 245.2 LBS | BODY MASS INDEX: 37.16 KG/M2 | SYSTOLIC BLOOD PRESSURE: 130 MMHG | HEIGHT: 68 IN | RESPIRATION RATE: 18 BRPM | TEMPERATURE: 98.2 F | OXYGEN SATURATION: 96 %

## 2019-03-26 DIAGNOSIS — G89.29 CHRONIC PAIN OF BOTH KNEES: Primary | ICD-10-CM

## 2019-03-26 DIAGNOSIS — M25.561 CHRONIC PAIN OF BOTH KNEES: Primary | ICD-10-CM

## 2019-03-26 DIAGNOSIS — M25.562 CHRONIC PAIN OF BOTH KNEES: Primary | ICD-10-CM

## 2019-03-26 RX ORDER — PHENOL/SODIUM PHENOLATE
20 AEROSOL, SPRAY (ML) MUCOUS MEMBRANE DAILY
Qty: 30 TAB | Refills: 3 | Status: SHIPPED | OUTPATIENT
Start: 2019-03-26 | End: 2019-08-10 | Stop reason: SDUPTHER

## 2019-03-26 RX ORDER — NAPROXEN AND ESOMEPRAZOLE MAGNESIUM 500; 20 MG/1; MG/1
1 TABLET, DELAYED RELEASE ORAL
COMMUNITY
Start: 2019-03-20 | End: 2022-05-25

## 2019-03-26 RX ORDER — FOLIC ACID 1 MG/1
TABLET ORAL DAILY
COMMUNITY

## 2019-03-26 RX ORDER — NAPROXEN 500 MG/1
500 TABLET ORAL 2 TIMES DAILY WITH MEALS
Qty: 60 TAB | Refills: 3 | Status: SHIPPED | OUTPATIENT
Start: 2019-03-26 | End: 2019-04-25

## 2019-03-26 NOTE — PROGRESS NOTES
Chief Complaint: 
Chief Complaint Patient presents with  Medication Evaluation History of Present Illness: 
She is a 78 y.o. female who presents today to discuss VIMOVO samples given to her at her orthopedist. She cant afford the medication and is wondering if she can get something cheaper and concerned about if it is helpful or harmful over all. She has had increased relief while taking the medication. She takes it very sparingly. She denies any worrisome side effects. We discussed that this 2-medications in one and that we can separate them and that would be cheaper for her. She is willing to proceed. We also discussed careful use of medication and she is only taking as needed. She has no major BP issues at this time. Reviewed PmHx, RxHx, FmHx, SocHx, AllgHx and updated and dated in the chart. Patient Active Problem List  
 Diagnosis  Obesity, morbid ([address]  Vitamin D deficiency  Symptomatic menopausal or female climacteric states  Hypercholesteremia Review of Systems - negative except as listed above in the HPI Objective:  
 
Vitals:  
 03/26/19 1512 BP: 130/70 Pulse: 68 Resp: 18 Temp: 98.2 °F (36.8 °C) TempSrc: Oral  
SpO2: 96% Weight: 245 lb 3.2 oz (111.2 kg) Height: 5' 7.5\" (1.715 m) Physical Examination:  
General appearance - alert, well appearing, and in no distress Mental status - alert, oriented to person, place, and time Chest - clear to auscultation, no wheezes, rales or rhonchi, symmetric air entry Heart - normal rate, regular rhythm, normal S1, S2, no murmurs, rubs, clicks or gallops Assessment/ Plan:  
Diagnoses and all orders for this visit: 
 
1. Chronic pain of both knees -     Omeprazole delayed release (PRILOSEC D/R) 20 mg tablet; Take 1 Tab by mouth daily. 
-     naproxen (NAPROSYN) 500 mg tablet; Take 1 Tab by mouth two (2) times daily (with meals) for 30 days. 79F with chronic OA of both knees. Will separate the prescriptions and went over potential side effects in detail. I have discussed the diagnosis with the patient and the intended treatment plan as seen in the above orders. The patient has received an after-visit summary and questions were answered concerning future plans. Asked to return should symptoms worsen or not improve with treatment. Any pending labs and studies will be relayed to patient when they become available. Pt verbalizes understanding of plan of care and denies further questions or concerns at this time. Follow-up and Dispositions · Return if symptoms worsen or fail to improve.  
  
 
Marky Marie MD

## 2019-03-26 NOTE — PROGRESS NOTES
All health maintenance and other pertinent information has been reviewed in preparation for today's office visit. Patient presents in the office today for: Chief Complaint Patient presents with  Medication Refill 1. Have you been to the ER, urgent care clinic since your last visit? Hospitalized since your last visit? No 
 
2. Have you seen or consulted any other health care providers outside of the [address] [place] [address] since your last visit? Include any pap smears or colon screening.  No

## 2019-08-10 DIAGNOSIS — G89.29 CHRONIC PAIN OF BOTH KNEES: ICD-10-CM

## 2019-08-10 DIAGNOSIS — M25.562 CHRONIC PAIN OF BOTH KNEES: ICD-10-CM

## 2019-08-10 DIAGNOSIS — M25.561 CHRONIC PAIN OF BOTH KNEES: ICD-10-CM

## 2019-08-10 RX ORDER — OMEPRAZOLE 20 MG/1
CAPSULE, DELAYED RELEASE ORAL
Qty: 90 CAP | Refills: 1 | Status: SHIPPED | OUTPATIENT
Start: 2019-08-10 | End: 2022-05-25

## 2019-09-18 ENCOUNTER — OFFICE VISIT (OUTPATIENT)
Dept: FAMILY MEDICINE CLINIC | Age: 79
End: 2019-09-18

## 2019-09-18 VITALS
HEIGHT: 68 IN | TEMPERATURE: 97.6 F | RESPIRATION RATE: 20 BRPM | OXYGEN SATURATION: 96 % | DIASTOLIC BLOOD PRESSURE: 66 MMHG | WEIGHT: 251.4 LBS | SYSTOLIC BLOOD PRESSURE: 110 MMHG | BODY MASS INDEX: 38.1 KG/M2 | HEART RATE: 60 BPM

## 2019-09-18 DIAGNOSIS — G89.29 CHRONIC PAIN OF RIGHT KNEE: ICD-10-CM

## 2019-09-18 DIAGNOSIS — E55.9 VITAMIN D DEFICIENCY: ICD-10-CM

## 2019-09-18 DIAGNOSIS — Z00.00 MEDICARE ANNUAL WELLNESS VISIT, SUBSEQUENT: Primary | ICD-10-CM

## 2019-09-18 DIAGNOSIS — E66.01 OBESITY, MORBID (HCC): ICD-10-CM

## 2019-09-18 DIAGNOSIS — M25.561 CHRONIC PAIN OF RIGHT KNEE: ICD-10-CM

## 2019-09-18 DIAGNOSIS — E78.00 HYPERCHOLESTEREMIA: ICD-10-CM

## 2019-09-18 RX ORDER — DICLOFENAC SODIUM 10 MG/G
2 GEL TOPICAL 4 TIMES DAILY
Qty: 100 G | Refills: 3 | Status: SHIPPED | OUTPATIENT
Start: 2019-09-18 | End: 2019-10-18

## 2019-09-18 NOTE — PROGRESS NOTES
Patient presents for continued complaints of right knee and leg pain. Patient states she stopped taking the Omeprazole and Naproxen due to feeling that the medications were causing her to have dizziness.

## 2019-09-18 NOTE — ASSESSMENT & PLAN NOTE
Stable, based on history, physical exam and review of pertinent labs, studies and medications; meds reconciled; continue current treatment plan. Key Obesity Meds     Patient is on no anti-obesity meds.         Lab Results   Component Value Date/Time    Glucose 92 12/03/2018 12:06 PM    Cholesterol, total 236 12/03/2018 12:06 PM    HDL Cholesterol 67 12/03/2018 12:06 PM    LDL, calculated 154 12/03/2018 12:06 PM    Triglyceride 77 12/03/2018 12:06 PM    Sodium 141 12/03/2018 12:06 PM    Potassium 4.8 12/03/2018 12:06 PM    ALT (SGPT) 13 12/03/2018 12:06 PM    AST (SGOT) 16 12/03/2018 12:06 PM    VITAMIN D, 25-HYDROXY 22.8 12/03/2018 12:06 PM

## 2019-09-18 NOTE — PATIENT INSTRUCTIONS
Well Visit, Over 72: Care Instructions Your Care Instructions Physical exams can help you stay healthy. Your doctor has checked your overall health and may have suggested ways to take good care of yourself. He or she also may have recommended tests. At home, you can help prevent illness with healthy eating, regular exercise, and other steps. Follow-up care is a key part of your treatment and safety. Be sure to make and go to all appointments, and call your doctor if you are having problems. It's also a good idea to know your test results and keep a list of the medicines you take. How can you care for yourself at home? · Reach and stay at a healthy weight. This will lower your risk for many problems, such as obesity, diabetes, heart disease, and high blood pressure. · Get at least 30 minutes of exercise on most days of the week. Walking is a good choice. You also may want to do other activities, such as running, swimming, cycling, or playing tennis or team sports. · Do not smoke. Smoking can make health problems worse. If you need help quitting, talk to your doctor about stop-smoking programs and medicines. These can increase your chances of quitting for good. · Protect your skin from too much sun. When you're outdoors from 10 a.m. to 4 p.m., stay in the shade or cover up with clothing and a hat with a wide brim. Wear sunglasses that block UV rays. Even when it's cloudy, put broad-spectrum sunscreen (SPF 30 or higher) on any exposed skin. · See a dentist one or two times a year for checkups and to have your teeth cleaned. · Wear a seat belt in the car. · Limit alcohol to 2 drinks a day for men and 1 drink a day for women. Too much alcohol can cause health problems. Follow your doctor's advice about when to have certain tests. These tests can spot problems early. For men and women · Cholesterol.  Your doctor will tell you how often to have this done based on your overall health and other things that can increase your risk for heart attack and stroke. · Blood pressure. Have your blood pressure checked during a routine doctor visit. Your doctor will tell you how often to check your blood pressure based on your age, your blood pressure results, and other factors. · Diabetes. Ask your doctor whether you should have tests for diabetes. · Vision. Experts recommend that you have yearly exams for glaucoma and other age-related eye problems. · Hearing. Tell your doctor if you notice any change in your hearing. You can have tests to find out how well you hear. · Colon cancer tests. Keep having colon cancer tests as your doctor recommends. You can have one of several types of tests. · Heart attack and stroke risk. At least every 4 to 6 years, you should have your risk for heart attack and stroke assessed. Your doctor uses factors such as your age, blood pressure, cholesterol, and whether you smoke or have diabetes to show what your risk for a heart attack or stroke is over the next 10 years. · Osteoporosis. Talk to your doctor about whether you should have a bone density test to find out whether you have thinning bones. Also ask your doctor about whether you should take calcium and vitamin D supplements. For women · Pap test and pelvic exam. You may no longer need a Pap test. Talk with your doctor about whether to stop or continue to have Pap tests. · Breast exam and mammogram. Ask how often you should have a mammogram, which is an X-ray of your breasts. A mammogram can spot breast cancer before it can be felt and when it is easiest to treat. · Thyroid disease. Talk to your doctor about whether to have your thyroid checked as part of a regular physical exam. Women have an increased chance of a thyroid problem. For men · Prostate exam. Talk to your doctor about whether you should have a blood test (called a PSA test) for prostate cancer.  Experts recommend that you discuss the benefits and risks of the test with your doctor before you decide whether to have this test. Some experts say that men ages 79 and older no longer need testing. · Abdominal aortic aneurysm. Ask your doctor whether you should have a test to check for an aneurysm. You may need a test if you ever smoked or if your parent, brother, sister, or child has had an aneurysm. When should you call for help? Watch closely for changes in your health, and be sure to contact your doctor if you have any problems or symptoms that concern you. Where can you learn more? Go to http://severino-luis alberto.info/. Enter Y379 in the search box to learn more about \"Well Visit, Over 65: Care Instructions. \" Current as of: December 13, 2018 Content Version: 12.1 © 5194-0016 [employer]. Care instructions adapted under license by [employer] (which disclaims liability or warranty for this information). If you have questions about a medical condition or this instruction, always ask your healthcare professional. [place] [address] any warranty or liability for your use of this information. Knee Pain or Injury in Children: Care Instructions Your Care Instructions Injuries are a common cause of knee problems. Sudden (acute) injuries may be caused by a direct blow to the knee. They can also be caused by abnormal twisting, bending, or falling on the knee during activities like playing sports. Pain, bruising, or swelling may be severe, and may start within minutes of the injury. Overuse is another cause of knee pain. Other causes are climbing stairs, kneeling, and other activities that use the knee. Rest, along with home treatment, often relieves pain and allows the knee to heal. If your child has a serious knee injury, he or she may need tests and treatment. Follow-up care is a key part of your child's treatment and safety.  Be sure to make and go to all appointments, and call your doctor if your child is having problems. It's also a good idea to know your child's test results and keep a list of the medicines your child takes. How can you care for your child at home? · Be safe with medicines. Read and follow all instructions on the label. ? If the doctor gave your child a prescription medicine for pain, give it as prescribed. ? If your child is not taking a prescription pain medicine, ask your doctor if your child can take an over-the-counter medicine. · Be sure your child rests and protects the knee. · Put ice or a cold pack on your child's knee for 10 to 20 minutes at a time. Put a thin cloth between the ice and your child's skin. · Prop up your child's sore knee on a pillow when icing it or anytime your child sits or lies down for the next 3 days. Try to keep your child's knee above the level of his or her heart. This will help reduce swelling. · If your child's knee is not swollen, you can put moist heat or a warm cloth on the knee. · If your doctor recommends an elastic bandage, sleeve, or other type of support for your child's knee, make sure your child wears it as directed. · Follow your doctor's instructions about how much weight your child can put on the leg. Make sure he or she uses crutches as instructed. · Follow the doctor's instructions about activity during your child's healing process. If your child can do mild exercise, slowly increase his or her activity. · Help your child reach and stay at a healthy weight. Extra weight can strain the joints, especially the knees and hips, and make the pain worse. Losing even a few pounds may help. When should you call for help? Call your doctor now or seek immediate medical care if: 
  · Your child has increasing or severe pain.  
  · Your child's leg or foot is cool or pale or changes color.  
  · Your child cannot stand or put weight on the knee.   · Your child's knee looks twisted or bent out of shape.  
  · Your child cannot move the knee.  
  · Your child has signs of infection, such as: 
? Increased pain, swelling, warmth, or redness on or behind the knee. ? Red streaks leading from the knee. ? Pus draining from a place on the knee. ? A fever.  
 Watch closely for changes in your child's health, and be sure to contact your doctor if: 
  · Your child has tingling, weakness, or numbness in the knee.  
  · Your child has any new symptoms, such as swelling.  
  · Your child has bruises from a knee injury that last longer than 2 weeks.  
  · Your child does not get better as expected. Where can you learn more? Go to http://severino-luis alberto.info/. Enter S735 in the search box to learn more about \"Knee Pain or Injury in Children: Care Instructions. \" Current as of: September 23, 2018 Content Version: 12.1 © 4519-0150 [employer]. Care instructions adapted under license by [employer] (which disclaims liability or warranty for this information). If you have questions about a medical condition or this instruction, always ask your healthcare professional. [place] [address] any warranty or liability for your use of this information. Knee Pain or Injury in Children: Care Instructions Your Care Instructions Injuries are a common cause of knee problems. Sudden (acute) injuries may be caused by a direct blow to the knee. They can also be caused by abnormal twisting, bending, or falling on the knee during activities like playing sports. Pain, bruising, or swelling may be severe, and may start within minutes of the injury. Overuse is another cause of knee pain. Other causes are climbing stairs, kneeling, and other activities that use the knee.  
Rest, along with home treatment, often relieves pain and allows the knee to heal. If your child has a serious knee injury, he or she may need tests and treatment. Follow-up care is a key part of your child's treatment and safety. Be sure to make and go to all appointments, and call your doctor if your child is having problems. It's also a good idea to know your child's test results and keep a list of the medicines your child takes. How can you care for your child at home? · Be safe with medicines. Read and follow all instructions on the label. ? If the doctor gave your child a prescription medicine for pain, give it as prescribed. ? If your child is not taking a prescription pain medicine, ask your doctor if your child can take an over-the-counter medicine. · Be sure your child rests and protects the knee. · Put ice or a cold pack on your child's knee for 10 to 20 minutes at a time. Put a thin cloth between the ice and your child's skin. · Prop up your child's sore knee on a pillow when icing it or anytime your child sits or lies down for the next 3 days. Try to keep your child's knee above the level of his or her heart. This will help reduce swelling. · If your child's knee is not swollen, you can put moist heat or a warm cloth on the knee. · If your doctor recommends an elastic bandage, sleeve, or other type of support for your child's knee, make sure your child wears it as directed. · Follow your doctor's instructions about how much weight your child can put on the leg. Make sure he or she uses crutches as instructed. · Follow the doctor's instructions about activity during your child's healing process. If your child can do mild exercise, slowly increase his or her activity. · Help your child reach and stay at a healthy weight. Extra weight can strain the joints, especially the knees and hips, and make the pain worse. Losing even a few pounds may help. When should you call for help? Call your doctor now or seek immediate medical care if: 
  · Your child has increasing or severe pain.   · Your child's leg or foot is cool or pale or changes color.  
  · Your child cannot stand or put weight on the knee.  
  · Your child's knee looks twisted or bent out of shape.  
  · Your child cannot move the knee.  
  · Your child has signs of infection, such as: 
? Increased pain, swelling, warmth, or redness on or behind the knee. ? Red streaks leading from the knee. ? Pus draining from a place on the knee. ? A fever.  
 Watch closely for changes in your child's health, and be sure to contact your doctor if: 
  · Your child has tingling, weakness, or numbness in the knee.  
  · Your child has any new symptoms, such as swelling.  
  · Your child has bruises from a knee injury that last longer than 2 weeks.  
  · Your child does not get better as expected. Where can you learn more? Go to http://severino-luis alberto.info/. Enter S735 in the search box to learn more about \"Knee Pain or Injury in Children: Care Instructions. \" Current as of: September 23, 2018 Content Version: 12.1 © 0083-3465 [employer]. Care instructions adapted under license by [employer] (which disclaims liability or warranty for this information). If you have questions about a medical condition or this instruction, always ask your healthcare professional. [address] any warranty or liability for your use of this information.

## 2019-09-18 NOTE — PROGRESS NOTES
This is the Subsequent Medicare Annual Wellness Exam, performed 12 months or more after the Initial AWV or the last Subsequent AWV    I have reviewed the patient's medical history in detail and updated the computerized patient record. History     79F who presents for AWV. However, she is still having increased knee pain and felt like the Naprosyn with PPI was making her lightheaded, so she stopped taking it. She refuses a albert or a walker at this time. Per her request, she would like to go back to Dr. Mehreen Downs. She understands that she may not be a surgical candidate. We discussed issues around weight loss and also careful follow up with Dr. Asif Chiu as well, since he is a knee specialist.     Past Medical History:   Diagnosis Date    Arthritis     Hypercholesterolemia       Past Surgical History:   Procedure Laterality Date    HX ORTHOPAEDIC      ankle     Current Outpatient Medications   Medication Sig Dispense Refill    diclofenac (VOLTAREN) 1 % gel Apply 2 g to affected area four (4) times daily for 30 days. 613 g 3    folic acid (FOLVITE) 1 mg tablet Take  by mouth daily.  cholecalciferol, vitamin D3, (VITAMIN D3) 2,000 unit tab Take  by mouth.  ALPHAGAN P 0.1 % ophthalmic solution PLACE 1 DROP IN BOTH EYES TWICE A DAY DISPENSE 15ML BOTTLE  1    pyridoxine, vitamin B6, (VITAMIN B-6) 100 mg tablet Take 1 Tab by mouth daily. 30 Tab 3    cyanocobalamin (VITAMIN B-12) 1,000 mcg sublingual tablet Take 1 tablet by mouth daily. 30 tablet 3    calcium carbonate (TUMS) 200 mg calcium (500 mg) Chew Take 1 Tab by mouth daily.  omeprazole (PRILOSEC) 20 mg capsule TAKE 1 CAPSULE BY MOUTH EVERY DAY 90 Cap 1    Naproxen-Esomeprazole Mag (VIMOVO) 500-20 mg TbID Take 1 Tab by mouth. Allergies   Allergen Reactions    Latex Other (comments)     Patient is allergic to the powder in the gloves. Itchy eyes and rash.       Family History   Problem Relation Age of Onset   [facility] Breast Cancer Mother 54    No Known Problems Father      Social History     Tobacco Use    Smoking status: Never Smoker    Smokeless tobacco: Never Used   Substance Use Topics    Alcohol use: Yes     Alcohol/week: 0.0 standard drinks     Comment: occasional     Patient Active Problem List   Diagnosis Code    Hypercholesteremia E78.00    Symptomatic menopausal or female climacteric states N95.1    Vitamin D deficiency E55.9    Obesity, morbid ([facility] [address] E66.01       Depression Risk Factor Screening:     3 most recent PHQ Screens 9/18/2019   Little interest or pleasure in doing things Not at all   Feeling down, depressed, irritable, or hopeless Not at all   Total Score PHQ 2 0     Alcohol Risk Factor Screening:   Patient does not drink alcohol. Functional Ability and Level of Safety:   Hearing Loss  Hearing is good. Activities of Daily Living  The home contains: no safety equipment. Patient does total self care    Fall Risk  Fall Risk Assessment, last 12 mths 9/18/2019   Able to walk? Yes   Fall in past 12 months? No   Fall with injury? -   Number of falls in past 12 months -   Fall Risk Score -       Abuse Screen  Patient is not abused    Cognitive Screening   Evaluation of Cognitive Function:  Has your family/caregiver stated any concerns about your memory: no  Normal, Clock Drawing test, Mini Cog test     /66 (BP 1 Location: Right arm, BP Patient Position: Sitting)   Pulse 60   Temp 97.6 °F (36.4 °C) (Oral)   Resp 20   Ht 5' 7.5\" (1.715 m)   Wt 251 lb 6.4 oz (114 kg)   LMP 11/17/1991   SpO2 96%   BMI 38.79 kg/m²   Physical Exam   HENT:   Head: Normocephalic and atraumatic. Cardiovascular: Normal rate, regular rhythm and normal heart sounds. Pulmonary/Chest: Effort normal.   Musculoskeletal: Normal range of motion. Right knee: She exhibits abnormal alignment. Neurological: She is alert. Nursing note and vitals reviewed.     Patient Care Team   Patient Care Team:  Paloma Alberts MD as PCP - General (Pediatrics)    Assessment/Plan   Education and counseling provided:  Are appropriate based on today's review and evaluation  End-of-Life planning (with patient's consent)  Pneumococcal Vaccine  Influenza Vaccine  Screening Mammography  Cardiovascular screening blood test  Bone mass measurement (DEXA)  Screening for glaucoma  Diabetes screening test    Diagnoses and all orders for this visit:    1. Medicare annual wellness visit, subsequent   Anticipatory guidance discussed. Immunizations reviewed   HM updated. 2. Chronic pain of right knee  -     REFERRAL TO ORTHOPEDICS  -     diclofenac (VOLTAREN) 1 % gel; Apply 2 g to affected area four (4) times daily for 30 days. 3. Obesity, morbid ([address]  Assessment & Plan:  Stable, based on history, physical exam and review of pertinent labs, studies and medications; meds reconciled; continue current treatment plan. Key Obesity Meds     Patient is on no anti-obesity meds. Lab Results   Component Value Date/Time    Glucose 92 12/03/2018 12:06 PM    Cholesterol, total 236 12/03/2018 12:06 PM    HDL Cholesterol 67 12/03/2018 12:06 PM    LDL, calculated 154 12/03/2018 12:06 PM    Triglyceride 77 12/03/2018 12:06 PM    Sodium 141 12/03/2018 12:06 PM    Potassium 4.8 12/03/2018 12:06 PM    ALT (SGPT) 13 12/03/2018 12:06 PM    AST (SGOT) 16 12/03/2018 12:06 PM    VITAMIN D, 25-HYDROXY 22.8 12/03/2018 12:06 PM       I have discussed the diagnosis with the patient and the intended treatment plan as seen in the above orders. The patient has received an after-visit summary and questions were answered concerning future plans. Asked to return should symptoms worsen or not improve with treatment. Any pending labs and studies will be relayed to patient when they become available. Pt verbalizes understanding of plan of care and denies further questions or concerns at this time. Discharge Visit Follow Up:  1. Return if symptoms worsen or fail to improve.    2. Return in 1 year for AWV   3. Return in 4-6 months for follow up and fasting labs. Maddie Carrillo MD    Patient Instructions     Well Visit, Over 72: Care Instructions  Your Care Instructions    Physical exams can help you stay healthy. Your doctor has checked your overall health and may have suggested ways to take good care of yourself. He or she also may have recommended tests. At home, you can help prevent illness with healthy eating, regular exercise, and other steps. Follow-up care is a key part of your treatment and safety. Be sure to make and go to all appointments, and call your doctor if you are having problems. It's also a good idea to know your test results and keep a list of the medicines you take. How can you care for yourself at home? · Reach and stay at a healthy weight. This will lower your risk for many problems, such as obesity, diabetes, heart disease, and high blood pressure. · Get at least 30 minutes of exercise on most days of the week. Walking is a good choice. You also may want to do other activities, such as running, swimming, cycling, or playing tennis or team sports. · Do not smoke. Smoking can make health problems worse. If you need help quitting, talk to your doctor about stop-smoking programs and medicines. These can increase your chances of quitting for good. · Protect your skin from too much sun. When you're outdoors from 10 a.m. to 4 p.m., stay in the shade or cover up with clothing and a hat with a wide brim. Wear sunglasses that block UV rays. Even when it's cloudy, put broad-spectrum sunscreen (SPF 30 or higher) on any exposed skin. · See a dentist one or two times a year for checkups and to have your teeth cleaned. · Wear a seat belt in the car. · Limit alcohol to 2 drinks a day for men and 1 drink a day for women. Too much alcohol can cause health problems. Follow your doctor's advice about when to have certain tests. These tests can spot problems early.   For men and women  · Cholesterol. Your doctor will tell you how often to have this done based on your overall health and other things that can increase your risk for heart attack and stroke. · Blood pressure. Have your blood pressure checked during a routine doctor visit. Your doctor will tell you how often to check your blood pressure based on your age, your blood pressure results, and other factors. · Diabetes. Ask your doctor whether you should have tests for diabetes. · Vision. Experts recommend that you have yearly exams for glaucoma and other age-related eye problems. · Hearing. Tell your doctor if you notice any change in your hearing. You can have tests to find out how well you hear. · Colon cancer tests. Keep having colon cancer tests as your doctor recommends. You can have one of several types of tests. · Heart attack and stroke risk. At least every 4 to 6 years, you should have your risk for heart attack and stroke assessed. Your doctor uses factors such as your age, blood pressure, cholesterol, and whether you smoke or have diabetes to show what your risk for a heart attack or stroke is over the next 10 years. · Osteoporosis. Talk to your doctor about whether you should have a bone density test to find out whether you have thinning bones. Also ask your doctor about whether you should take calcium and vitamin D supplements. For women  · Pap test and pelvic exam. You may no longer need a Pap test. Talk with your doctor about whether to stop or continue to have Pap tests. · Breast exam and mammogram. Ask how often you should have a mammogram, which is an X-ray of your breasts. A mammogram can spot breast cancer before it can be felt and when it is easiest to treat. · Thyroid disease. Talk to your doctor about whether to have your thyroid checked as part of a regular physical exam. Women have an increased chance of a thyroid problem.   For men  · Prostate exam. Talk to your doctor about whether you should have a blood test (called a PSA test) for prostate cancer. Experts recommend that you discuss the benefits and risks of the test with your doctor before you decide whether to have this test. Some experts say that men ages 79 and older no longer need testing. · Abdominal aortic aneurysm. Ask your doctor whether you should have a test to check for an aneurysm. You may need a test if you ever smoked or if your parent, brother, sister, or child has had an aneurysm. When should you call for help? Watch closely for changes in your health, and be sure to contact your doctor if you have any problems or symptoms that concern you. Where can you learn more? Go to http://severino-luis alberto.info/. Enter V539 in the search box to learn more about \"Well Visit, Over 65: Care Instructions. \"  Current as of: December 13, 2018  Content Version: 12.1  © 7012-6259 [employer], Incorporated. Care instructions adapted under license by "[employer] (which disclaims liability or warranty for this information). If you have questions about a medical condition or this instruction, always ask your healthcare professional. [address] any warranty or liability for your use of this information.

## 2019-10-05 LAB
25(OH)D3+25(OH)D2 SERPL-MCNC: 23.3 NG/ML (ref 30–100)
ALBUMIN SERPL-MCNC: 3.8 G/DL (ref 3.5–4.7)
ALBUMIN/GLOB SERPL: 1.1 {RATIO} (ref 1.2–2.2)
ALP SERPL-CCNC: 58 IU/L (ref 39–117)
ALT SERPL-CCNC: 11 IU/L (ref 0–32)
AST SERPL-CCNC: 13 IU/L (ref 0–40)
BASOPHILS # BLD AUTO: 0 X10E3/UL (ref 0–0.2)
BASOPHILS NFR BLD AUTO: 1 %
BILIRUB SERPL-MCNC: 0.8 MG/DL (ref 0–1.2)
BUN SERPL-MCNC: 17 MG/DL (ref 8–27)
BUN/CREAT SERPL: 23 (ref 12–28)
CALCIUM SERPL-MCNC: 9.2 MG/DL (ref 8.7–10.3)
CHLORIDE SERPL-SCNC: 103 MMOL/L (ref 96–106)
CHOLEST SERPL-MCNC: 255 MG/DL (ref 100–199)
CO2 SERPL-SCNC: 24 MMOL/L (ref 20–29)
CREAT SERPL-MCNC: 0.73 MG/DL (ref 0.57–1)
EOSINOPHIL # BLD AUTO: 0.4 X10E3/UL (ref 0–0.4)
EOSINOPHIL NFR BLD AUTO: 5 %
ERYTHROCYTE [DISTWIDTH] IN BLOOD BY AUTOMATED COUNT: 13.1 % (ref 12.3–15.4)
GLOBULIN SER CALC-MCNC: 3.5 G/DL (ref 1.5–4.5)
GLUCOSE SERPL-MCNC: 99 MG/DL (ref 65–99)
HCT VFR BLD AUTO: 39.2 % (ref 34–46.6)
HDLC SERPL-MCNC: 68 MG/DL
HGB BLD-MCNC: 13.1 G/DL (ref 11.1–15.9)
IMM GRANULOCYTES # BLD AUTO: 0 X10E3/UL (ref 0–0.1)
IMM GRANULOCYTES NFR BLD AUTO: 0 %
INTERPRETATION, 910389: NORMAL
LDLC SERPL CALC-MCNC: 172 MG/DL (ref 0–99)
LYMPHOCYTES # BLD AUTO: 1.6 X10E3/UL (ref 0.7–3.1)
LYMPHOCYTES NFR BLD AUTO: 21 %
MCH RBC QN AUTO: 29.8 PG (ref 26.6–33)
MCHC RBC AUTO-ENTMCNC: 33.4 G/DL (ref 31.5–35.7)
MCV RBC AUTO: 89 FL (ref 79–97)
MONOCYTES # BLD AUTO: 0.5 X10E3/UL (ref 0.1–0.9)
MONOCYTES NFR BLD AUTO: 7 %
NEUTROPHILS # BLD AUTO: 5.1 X10E3/UL (ref 1.4–7)
NEUTROPHILS NFR BLD AUTO: 66 %
PLATELET # BLD AUTO: 289 X10E3/UL (ref 150–450)
POTASSIUM SERPL-SCNC: 4.4 MMOL/L (ref 3.5–5.2)
PROT SERPL-MCNC: 7.3 G/DL (ref 6–8.5)
RBC # BLD AUTO: 4.4 X10E6/UL (ref 3.77–5.28)
SODIUM SERPL-SCNC: 142 MMOL/L (ref 134–144)
TRIGL SERPL-MCNC: 76 MG/DL (ref 0–149)
VLDLC SERPL CALC-MCNC: 15 MG/DL (ref 5–40)
WBC # BLD AUTO: 7.6 X10E3/UL (ref 3.4–10.8)

## 2019-10-23 ENCOUNTER — TELEPHONE (OUTPATIENT)
Dept: FAMILY MEDICINE CLINIC | Age: 79
End: 2019-10-23

## 2019-10-23 NOTE — TELEPHONE ENCOUNTER
----- Message from Ritchie Bansal sent at 10/23/2019  9:58 AM EDT -----  Regarding: /telephone  Contact: [phone]  Pt had lab work done a few weeks ago and hasn't heard back with the results.

## 2019-10-23 NOTE — TELEPHONE ENCOUNTER
Voice message left with labs are stable and return in 6 months to redraw and continue to take your Vitamin D.

## 2019-10-24 NOTE — TELEPHONE ENCOUNTER
----- Message from Whereoscopealejandrosharonda Allen sent at 10/24/2019 12:41 PM EDT -----  Regarding: : Dr. Brii Ayon: [phone]  Callback required yes/no and why:  No    Best contact number(s): [phone]  Details to clarify the request: pt stated to tell [place] she received her message about her results.  Thank you

## 2020-02-06 ENCOUNTER — HOSPITAL ENCOUNTER (OUTPATIENT)
Dept: MAMMOGRAPHY | Age: 80
Discharge: HOME | End: 2020-02-06
Attending: INTERNAL MEDICINE
Payer: MEDICARE

## 2020-02-06 DIAGNOSIS — Z12.31 ENCOUNTER FOR SCREENING MAMMOGRAM FOR BREAST CANCER: ICD-10-CM

## 2020-02-06 PROCEDURE — 77067 SCR MAMMO BI INCL CAD: CPT

## 2020-07-09 ENCOUNTER — VIRTUAL VISIT (OUTPATIENT)
Dept: FAMILY MEDICINE CLINIC | Age: 80
End: 2020-07-09

## 2020-07-09 DIAGNOSIS — M25.561 CHRONIC PAIN OF RIGHT KNEE: Primary | ICD-10-CM

## 2020-07-09 DIAGNOSIS — M25.661 KNEE STIFFNESS, RIGHT: ICD-10-CM

## 2020-07-09 DIAGNOSIS — G89.29 CHRONIC PAIN OF RIGHT KNEE: Primary | ICD-10-CM

## 2020-07-09 NOTE — PROGRESS NOTES
CC:  Chief Complaint   Patient presents with    Knee Pain     Stiffness in right knee     Gabo Leon is a 78 y.o. female who was seen by synchronous (real-time) audio-TELEPHONE technology on 7/9/2020 for Knee Pain (Stiffness in right knee)      Assessment & Plan:   80F with worsening R-knee pain and stiffness. No new trauma, but unable to ambulate without increasing pain and discomfort. We discussed sending her back to ortho and/or also obtaining and MRI. Patient would like to consider different ortho at this time. Will advise after we get the MRI. Diagnoses and all orders for this visit:    1. Chronic pain of right knee  -     MRI KNEE RT WO CONT; Future    2. Knee stiffness, right  -     MRI KNEE RT WO CONT; Future    I spent at least 15 minutes on this visit with this established patient. Subjective:   80F with h/o OA of the right knee and hip. She has been evaluated by Dr. Dionisio Ardon (ortho) in the past and also had steroidal injection in the hip in February 2020. The patient does not feel that the injection was helpful and still has pain in the R-hip and now worsening in the R-knee. She feels that when she reviewed her xrays - there was a \"black dot\" in the films that was mentioned, but not further discussed with her. I reviewed her films and I do not see a discussion of this. She has Tricompartmental disease and OA of the right knee on previous xrays. XR Results (most recent):  Results from Hospital Encounter encounter on 02/13/19   XR KNEE RT 3 V    Narrative EXAM: XR KNEE RT 3 V    INDICATION: Increasing right knee pain since fall in October, 2018. COMPARISON: Right knee views on 3/10/2010. FINDINGS: Three views of the right knee demonstrate no fracture or other acute  osseous or articular abnormality. There is no effusion. Increased  tricompartmental osteoarthritis is at least moderate. Impression IMPRESSION:   1. No fracture.   2. At least moderate increased tricompartmental osteoarthritis. The patient feels that her knee is getting worse. She denies any new trauma, but has a hard time straightening it out in the morning and having more and more discomfort. Prior to Admission medications    Medication Sig Start Date End Date Taking? Authorizing Provider   omeprazole (PRILOSEC) 20 mg capsule TAKE 1 CAPSULE BY MOUTH EVERY DAY 8/10/19   Wendi López MD   Naproxen-Esomeprazole Mag (VIMOVO) 500-20 mg TbID Take 1 Tab by mouth. 3/20/19   Provider, Historical   folic acid (FOLVITE) 1 mg tablet Take  by mouth daily. Provider, Historical   cholecalciferol, vitamin D3, (VITAMIN D3) 2,000 unit tab Take  by mouth. Provider, Historical   ALPHAGAN P 0.1 % ophthalmic solution PLACE 1 DROP IN BOTH EYES TWICE A DAY DISPENSE 15ML BOTTLE 8/1/18   Provider, Historical   pyridoxine, vitamin B6, (VITAMIN B-6) 100 mg tablet Take 1 Tab by mouth daily. 7/11/17   Wendi López MD   cyanocobalamin (VITAMIN B-12) 1,000 mcg sublingual tablet Take 1 tablet by mouth daily. 10/14/14   Wendi López MD   calcium carbonate (TUMS) 200 mg calcium (500 mg) Chew Take 1 Tab by mouth daily. Provider, Historical     Patient Active Problem List   Diagnosis Code    Hypercholesteremia E78.00    Symptomatic menopausal or female climacteric states N95.1    Vitamin D deficiency E55.9    Obesity, morbid ([facility] [address] E66.01     Past Medical History:   Diagnosis Date    Arthritis     Hypercholesterolemia      Past Surgical History:   Procedure Laterality Date    HX ORTHOPAEDIC      ankle     Family History   Problem Relation Age of Onset    Breast Cancer Mother 54    No Known Problems Father      Social History     Tobacco Use    Smoking status: Never Smoker    Smokeless tobacco: Never Used   Substance Use Topics    Alcohol use: Yes     Alcohol/week: 0.0 standard drinks     Comment: occasional       Review of Systems   Musculoskeletal: Positive for joint pain (right knee stiffness and pain).    All other systems reviewed and are negative. Objective:   LMP 11/17/1991          General: alert, cooperative, no distress   Mental  status: normal mood, behavior, speech and thought processes, able to follow commands   Psychiatric: normal affect, consistent with stated mood, no evidence of hallucinations     We discussed the expected course, resolution and complications of the diagnosis(es) in detail. Medication risks, benefits, costs, interactions, and alternatives were discussed as indicated. I advised her to contact the office if her condition worsens, changes or fails to improve as anticipated. Sheexpressed understanding with the diagnosis(es) and plan. Blaire Carrizales, who was evaluated through a patient-initiated, synchronous (real-time) audio-TELEPHONE encounter, and/or her healthcare decision maker, is aware that it is a billable service, with coverage as determined by her insurance carrier. She provided verbal consent to proceed: Yes, and patient identification was verified. It was conducted pursuant to the emergency declaration under the [facility], [address] [place] [address] authority and the [employer] General Act. A caregiver was present when appropriate. Ability to conduct physical exam was limited. I was in the office. The patient was at home. Follow-up and Dispositions    · Return if symptoms worsen or fail to improve.          Marcos Manning MD

## 2020-07-21 ENCOUNTER — HOSPITAL ENCOUNTER (OUTPATIENT)
Dept: MRI IMAGING | Age: 80
Discharge: HOME | End: 2020-07-21
Attending: INTERNAL MEDICINE
Payer: MEDICARE

## 2020-07-21 DIAGNOSIS — G89.29 CHRONIC PAIN OF RIGHT KNEE: ICD-10-CM

## 2020-07-21 DIAGNOSIS — M25.561 CHRONIC PAIN OF RIGHT KNEE: ICD-10-CM

## 2020-07-21 DIAGNOSIS — M25.661 KNEE STIFFNESS, RIGHT: ICD-10-CM

## 2020-07-21 PROCEDURE — 73721 MRI JNT OF LWR EXTRE W/O DYE: CPT

## 2020-07-22 ENCOUNTER — VIRTUAL VISIT (OUTPATIENT)
Dept: FAMILY MEDICINE CLINIC | Age: 80
End: 2020-07-22

## 2020-07-22 DIAGNOSIS — E66.01 OBESITY, MORBID (HCC): ICD-10-CM

## 2020-07-22 DIAGNOSIS — M17.11 OSTEOARTHRITIS OF RIGHT KNEE, UNSPECIFIED OSTEOARTHRITIS TYPE: Primary | ICD-10-CM

## 2020-07-22 NOTE — PROGRESS NOTES
CC:  Chief Complaint   Patient presents with    Abnormal MRI    Follow-up    Referral Follow Up       Indu Roberts is a 78 y.o. female who was seen by synchronous (real-time) TELEPHONE CALL on 7/22/2020 for Abnormal MRI; Follow-up; and Referral Follow Up      Assessment & Plan:   Diagnoses and all orders for this visit:    1. Osteoarthritis of right knee, unspecified osteoarthritis type  -     REFERRAL TO ORTHOPEDICS    2. Obesity, morbid ([address]   The patient is asked to make an attempt to improve diet and exercise patterns to aid in medical management of this problem. I spent at least 15 minutes on this visit with this established patient. Subjective:   80F who has had ongoing R-knee pain for quite sometime. She had been recently seen by Dr. Linda Hardy and had a hip steroidal injection, but felt like her knee was not addressed. She is still having profound knee pain and requested a new referral. However, we also discussed getting an MRI of the knee as well. This returned today and showed the following:    MRI Results (most recent):  Results from [place] encounter on 07/21/20   MRI KNEE RT WO CONT    Narrative EXAM:  MRI KNEE RT WO CONT    INDICATION: Worsening right knee stiffness. Known osteoarthritis    COMPARISON: 2/13/2019    TECHNIQUE: Axial T2 fat-saturated and proton density fat-saturated; coronal T1  and proton density fat-saturated; and sagittal T2 fat-saturated, proton density  fat-saturated, and gradient echo MRI of the right knee . CONTRAST:  None. FINDINGS:     ACL and PCL: Intact. Collateral ligaments and posterior, lateral corner: Intact. Extensor mechanism: Intact. .     Patellofemoral alignment: No patellar subluxation/tilt. Trochlear groove is not  hypoplastic. TT-TG distance: 8 mm    Joint fluid: Small effusion.  8 mm loose body posterior medial joint deep to the  gastrocnemius origin additional small loose bodies posterior lateral joint line     Medial meniscus: Posterior horn is degenerated with small oblique undersurface  tear. Body is minimally subluxed from the joint and truncated as well      Lateral meniscus: Posterior horn is degenerated. Radial tearing at the posterior  horn root insertion. Body is degenerated but not subluxed from the joint    Articular cartilage: Diffuse high-grade partial and full-thickness cartilage  loss weightbearing and far posterior lateral femoral condyle as well as the  central and lateral lateral tibial plateau    Diffuse intermediate grade cartilage loss with weightbearing and posterior  weightbearing surface medial femoral condyle    Diffuse intermediate grade cartilage loss medial and lateral patellar facets,  high-grade along the far medial margin of the medial patellar facet. Similar  findings are noted throughout the trochlear groove     Bone marrow: Tricompartmental degenerative spurring. No fracture     Soft tissue mass: None. There is diffuse muscular atrophy      Impression IMPRESSION:    1 . Tricompartmental cartilage loss most severe laterally with multiple loose  bodies  2. Degenerative radial tearing posterior horn root insertion of the lateral  meniscus  3. Degenerative undersurface tearing posterior horn medial meniscus. Body is  truncated and slightly subluxed from the joint            Prior to Admission medications    Medication Sig Start Date End Date Taking? Authorizing Provider   omeprazole (PRILOSEC) 20 mg capsule TAKE 1 CAPSULE BY MOUTH EVERY DAY 8/10/19   Antonina Vasquez MD   Naproxen-Esomeprazole Mag (VIMOVO) 500-20 mg TbID Take 1 Tab by mouth. 3/20/19   Provider, Historical   folic acid (FOLVITE) 1 mg tablet Take  by mouth daily. Provider, Historical   cholecalciferol, vitamin D3, (VITAMIN D3) 2,000 unit tab Take  by mouth.     Provider, Historical   ALPHAGAN P 0.1 % ophthalmic solution PLACE 1 DROP IN BOTH EYES TWICE A DAY DISPENSE 15ML BOTTLE 8/1/18   Provider, Historical   pyridoxine, vitamin B6, (VITAMIN B-6) 100 mg tablet Take 1 Tab by mouth daily. 7/11/17   Mich Conde MD   cyanocobalamin (VITAMIN B-12) 1,000 mcg sublingual tablet Take 1 tablet by mouth daily. 10/14/14   Mich Conde MD   calcium carbonate (TUMS) 200 mg calcium (500 mg) Chew Take 1 Tab by mouth daily. Provider, Historical     Patient Active Problem List   Diagnosis Code    Hypercholesteremia E78.00    Symptomatic menopausal or female climacteric states N95.1    Vitamin D deficiency E55.9    Obesity, morbid ([address] E66.01     Past Medical History:   Diagnosis Date    Arthritis     Hypercholesterolemia      Past Surgical History:   Procedure Laterality Date    HX ORTHOPAEDIC      ankle     Family History   Problem Relation Age of Onset    Breast Cancer Mother 54    No Known Problems Father      Social History     Tobacco Use    Smoking status: Never Smoker    Smokeless tobacco: Never Used   Substance Use Topics    Alcohol use: Yes     Alcohol/week: 0.0 standard drinks     Comment: occasional       Review of Systems   Constitutional: Negative. Musculoskeletal: Positive for joint pain (right knee pain) and myalgias. All other systems reviewed and are negative. Objective:   LMP 11/17/1991      General: alert, cooperative, no distress   Mental  status: normal mood, behavior, speech and thought processes, able to follow commands   Psychiatric: normal affect, consistent with stated mood, no evidence of hallucinations     We discussed the expected course, resolution and complications of the diagnosis(es) in detail. Medication risks, benefits, costs, interactions, and alternatives were discussed as indicated. I advised her to contact the office if her condition worsens, changes or fails to improve as anticipated. She expressed understanding with the diagnosis(es) and plan.      Awa Luis, who was evaluated through a patient-initiated, synchronous (real-time) TELEPHONE CALL encounter, and/or her healthcare decision maker, is aware that it is a billable service, with coverage as determined by her insurance carrier. She provided verbal consent to proceed: Yes, and patient identification was verified. It was conducted pursuant to the emergency declaration under the [facility], [address] [place] [address] authority and the [employer] and [employer] General Act. A caregiver was present when appropriate. Ability to conduct physical exam was limited. I was in the office. The patient was at home. I have discussed the diagnosis with the patient and the intended treatment plan as seen in the above orders. The patient has received an after-visit summary and questions were answered concerning future plans. Asked to return should symptoms worsen or not improve with treatment. Any pending labs and studies will be relayed to patient when they become available. Pt verbalizes understanding of plan of care and denies further questions or concerns at this time. Follow-up and Dispositions    · Return if symptoms worsen or fail to improve.          Brittany Stover MD

## 2020-08-25 ENCOUNTER — OFFICE VISIT (OUTPATIENT)
Dept: FAMILY MEDICINE CLINIC | Age: 80
End: 2020-08-25
Payer: MEDICARE

## 2020-08-25 VITALS
HEART RATE: 80 BPM | DIASTOLIC BLOOD PRESSURE: 76 MMHG | BODY MASS INDEX: 38.25 KG/M2 | SYSTOLIC BLOOD PRESSURE: 128 MMHG | TEMPERATURE: 98.8 F | WEIGHT: 252.4 LBS | RESPIRATION RATE: 20 BRPM | OXYGEN SATURATION: 97 % | HEIGHT: 68 IN

## 2020-08-25 DIAGNOSIS — Z13.1 SCREENING FOR DIABETES MELLITUS: ICD-10-CM

## 2020-08-25 DIAGNOSIS — Z13.0 SCREENING FOR DEFICIENCY ANEMIA: ICD-10-CM

## 2020-08-25 DIAGNOSIS — E78.00 HYPERCHOLESTEREMIA: Primary | ICD-10-CM

## 2020-08-25 DIAGNOSIS — H53.9 VISUAL CHANGES: ICD-10-CM

## 2020-08-25 DIAGNOSIS — E55.9 VITAMIN D DEFICIENCY: ICD-10-CM

## 2020-08-25 PROCEDURE — G8417 CALC BMI ABV UP PARAM F/U: HCPCS | Performed by: INTERNAL MEDICINE

## 2020-08-25 PROCEDURE — G8399 PT W/DXA RESULTS DOCUMENT: HCPCS | Performed by: INTERNAL MEDICINE

## 2020-08-25 PROCEDURE — 1101F PT FALLS ASSESS-DOCD LE1/YR: CPT | Performed by: INTERNAL MEDICINE

## 2020-08-25 PROCEDURE — G8536 NO DOC ELDER MAL SCRN: HCPCS | Performed by: INTERNAL MEDICINE

## 2020-08-25 PROCEDURE — G8510 SCR DEP NEG, NO PLAN REQD: HCPCS | Performed by: INTERNAL MEDICINE

## 2020-08-25 PROCEDURE — 1090F PRES/ABSN URINE INCON ASSESS: CPT | Performed by: INTERNAL MEDICINE

## 2020-08-25 PROCEDURE — 99213 OFFICE O/P EST LOW 20 MIN: CPT | Performed by: INTERNAL MEDICINE

## 2020-08-25 PROCEDURE — G8427 DOCREV CUR MEDS BY ELIG CLIN: HCPCS | Performed by: INTERNAL MEDICINE

## 2020-08-25 NOTE — PROGRESS NOTES
Identified pt with two pt identifiers(name and ). Chief Complaint   Patient presents with    Eye Injury     rt eye- eye dr saw fluid and wants to know where it is coming from         Health Maintenance Due   Topic    Shingrix Vaccine Age 50> (1 of 2)    GLAUCOMA SCREENING Q2Y     Medicare Yearly Exam        Wt Readings from Last 3 Encounters:   20 252 lb 6.4 oz (114.5 kg)   19 251 lb 6.4 oz (114 kg)   19 245 lb 3.2 oz (111.2 kg)     Temp Readings from Last 3 Encounters:   20 98.8 °F (37.1 °C) (Oral)   19 97.6 °F (36.4 °C) (Oral)   19 98.2 °F (36.8 °C) (Oral)     BP Readings from Last 3 Encounters:   20 128/76   19 110/66   19 130/70     Pulse Readings from Last 3 Encounters:   20 80   19 60   19 68         Learning Assessment:  :     Learning Assessment 2015 3/11/2014   PRIMARY LEARNER Patient Patient   HIGHEST LEVEL OF EDUCATION - PRIMARY LEARNER  GRADUATED HIGH SCHOOL OR GED GRADUATED HIGH SCHOOL OR GED   BARRIERS PRIMARY LEARNER NONE NONE   CO-LEARNER CAREGIVER No No   PRIMARY LANGUAGE ENGLISH ENGLISH   LEARNER PREFERENCE PRIMARY LISTENING READING     - VIDEOS     - DEMONSTRATION   ANSWERED BY Patient patient   RELATIONSHIP SELF SELF       Depression Screening:  :     3 most recent PHQ Screens 2020   Little interest or pleasure in doing things Not at all   Feeling down, depressed, irritable, or hopeless Not at all   Total Score PHQ 2 0       Fall Risk Assessment:  :     Fall Risk Assessment, last 12 mths 2020   Able to walk? Yes   Fall in past 12 months? No   Fall with injury? -   Number of falls in past 12 months -   Fall Risk Score -       Abuse Screening:  :     Abuse Screening Questionnaire 2020 2017 10/20/2016 10/14/2014 3/11/2014   Do you ever feel afraid of your partner? N N N N N   Are you in a relationship with someone who physically or mentally threatens you?  N N N N N   Is it safe for you to go home? Y Y Y Y Y       Coordination of Care Questionnaire:  :     1) Have you been to an emergency room, urgent care clinic since your last visit? no   Hospitalized since your last visit? no             2) Have you seen or consulted any other health care providers outside of [address] [place] [address] since your last visit? yes eye  8/14/2020     3) Do you have an Advance Directive on file? no  Are you interested in receiving information about Advance Directives? no    Reviewed record in preparation for visit and have obtained necessary documentation.

## 2020-08-25 NOTE — LETTER
2020 RE: Ms. Jesus Augustin [address] [facility] [phone] : 3/18/2020. Dear Dr. Aerlis Singer, Thanks for seeing Ms. Rivera. She came in with a drawing that was concerning for possible eye changes. She tried her best to summarize the problems, but we will need your note. She does not have a h/o hypertension or Type II Diabetes. She does have elevated lipids. She is going to be seen for a follow up and Medicare wellness visit in Mid-September. If here are any other labs that you would like us to check for her, please let us know. Please find your most recent results below. Lab Results Component Value Date/Time Sodium 142 10/04/2019 11:49 AM  
 Potassium 4.4 10/04/2019 11:49 AM  
 Chloride 103 10/04/2019 11:49 AM  
 CO2 24 10/04/2019 11:49 AM  
 Glucose 99 10/04/2019 11:49 AM  
 BUN 17 10/04/2019 11:49 AM  
 Creatinine 0.73 10/04/2019 11:49 AM  
 BUN/Creatinine ratio 23 10/04/2019 11:49 AM  
 GFR est AA 89 10/04/2019 11:49 AM  
 GFR est non-AA 77 10/04/2019 11:49 AM  
 Calcium 9.2 10/04/2019 11:49 AM  
 Bilirubin, total 0.8 10/04/2019 11:49 AM  
 Alk. phosphatase 58 10/04/2019 11:49 AM  
 Protein, total 7.3 10/04/2019 11:49 AM  
 Albumin 3.8 10/04/2019 11:49 AM  
 A-G Ratio 1.1 (L) 10/04/2019 11:49 AM  
 ALT (SGPT) 11 10/04/2019 11:49 AM  
 AST (SGOT) 13 10/04/2019 11:49 AM  
 
 
Lab Results Component Value Date/Time Cholesterol, total 255 (H) 10/04/2019 11:49 AM  
 HDL Cholesterol 68 10/04/2019 11:49 AM  
 LDL, calculated 172 (H) 10/04/2019 11:49 AM  
 VLDL, calculated 15 10/04/2019 11:49 AM  
 Triglyceride 76 10/04/2019 11:49 AM  
 
 
Please call me if you have any questions: [phone] Sincerely, Antonette Mcgregor MD

## 2020-08-26 NOTE — PROGRESS NOTES
CC:  Chief Complaint   Patient presents with    Eye Injury     rt eye- eye dr saw fluid and wants to know where it is coming from          Subjective:   80F who presents for follow up after recent ophthalmological eye evaluation with Dr. Moreno Alvarez. I have not received Dr. William Alves note yet. But the patient brings in a drawing which is not clear and is asking about diabetic status and hypertension. Per our documentation, the patient does not have diabetes or being treated for hypertension presently. She does have hypercholesterolemia. The concern is not clear and we will seek out Dr. William Alves note. In addition, she was referred to a specialist. I enquired whether this was a retinal specialist and name - but the patient could not provide the needed information. The patient is due for fasting labs and will return for these in the next few days. She is not fasting this afternoon. Otherwise, she reports that the R-knee is better. Not as painful. She is now focused on her visual acuity,     Patient Active Problem List    Diagnosis Date Noted    Obesity, morbid ([facility] [address] 04/30/2018    Vitamin D deficiency 12/15/2011    Symptomatic menopausal or female climacteric states 06/06/2011    Hypercholesteremia 06/08/2009     Current Outpatient Medications   Medication Sig Dispense Refill    folic acid (FOLVITE) 1 mg tablet Take  by mouth daily.  cholecalciferol, vitamin D3, (VITAMIN D3) 2,000 unit tab Take  by mouth.  ALPHAGAN P 0.1 % ophthalmic solution PLACE 1 DROP IN BOTH EYES TWICE A DAY DISPENSE 15ML BOTTLE  1    pyridoxine, vitamin B6, (VITAMIN B-6) 100 mg tablet Take 1 Tab by mouth daily. 30 Tab 3    cyanocobalamin (VITAMIN B-12) 1,000 mcg sublingual tablet Take 1 tablet by mouth daily. 30 tablet 3    calcium carbonate (TUMS) 200 mg calcium (500 mg) Chew Take 1 Tab by mouth daily.       omeprazole (PRILOSEC) 20 mg capsule TAKE 1 CAPSULE BY MOUTH EVERY DAY 90 Cap 1    Naproxen-Esomeprazole Mag (VIMOVO) 500-20 mg TbID Take 1 Tab by mouth. Allergies   Allergen Reactions    Latex Other (comments)     Patient is allergic to the powder in the gloves. Itchy eyes and rash. Past Medical History:   Diagnosis Date    Arthritis     Hypercholesterolemia      Past Surgical History:   Procedure Laterality Date    HX ORTHOPAEDIC      ankle     Family History   Problem Relation Age of Onset    Breast Cancer Mother 54    No Known Problems Father        Social History     Tobacco Use    Smoking status: Never Smoker    Smokeless tobacco: Never Used   Substance Use Topics    Alcohol use: Yes     Alcohol/week: 0.0 standard drinks     Comment: occasional      Review of Systems    Pertinent items are noted in HPI. Objective:   /76 (BP 1 Location: Right arm, BP Patient Position: Sitting)   Pulse 80   Temp 98.8 °F (37.1 °C) (Oral)   Resp 20   Ht 5' 7.5\" (1.715 m)   Wt 252 lb 6.4 oz (114.5 kg)   LMP 11/17/1991   SpO2 97%   BMI 38.95 kg/m²        General: alert, cooperative, no distress   Mental  status: normal mood, behavior, speech, dress, motor activity, and thought processes, able to follow commands   HENT: NCAT   Neck: no visualized mass   Resp: no respiratory distress   Neuro: no gross deficits   Skin: no discoloration or lesions of concern on visible areas   Psychiatric: normal affect, consistent with stated mood, no evidence of hallucinations         Assessment/Plan:   80F who presents to discuss recent eye findings, but we don't have all the information. Will request note from Dr. Bella Muñoz office. She is due for fasting labs and will return to have these done. Will also screen for Diabetes, though BS have been normal.     Diagnoses and all orders for this visit:    1. Hypercholesteremia  -     LIPID PANEL; Future  -     METABOLIC PANEL, COMPREHENSIVE; Future    2. Screening for diabetes mellitus  -     METABOLIC PANEL, COMPREHENSIVE; Future  -     HEMOGLOBIN A1C WITH EAG; Future    3. Vitamin D deficiency  -     VITAMIN D, 25 HYDROXY; Future    4. Screening for deficiency anemia  -     CBC WITH AUTOMATED DIFF; Future    5. Visual changes   Need more information. Ophthalmology note being requested. Letter written with information about most recent labs and given to the patient to take to specialist.     I have discussed the diagnosis with the patient and the intended treatment plan as seen in the above orders. The patient has received an after-visit summary and questions were answered concerning future plans. Asked to return should symptoms worsen or not improve with treatment. Any pending labs and studies will be relayed to patient when they become available. Pt verbalizes understanding of plan of care and denies further questions or concerns at this time. Follow-up and Dispositions    · Return if symptoms worsen or fail to improve.        Catrina Gordon MD

## 2020-09-03 ENCOUNTER — LAB ONLY (OUTPATIENT)
Dept: FAMILY MEDICINE CLINIC | Age: 80
End: 2020-09-03

## 2020-09-03 ENCOUNTER — TELEPHONE (OUTPATIENT)
Dept: FAMILY MEDICINE CLINIC | Age: 80
End: 2020-09-03

## 2020-09-03 DIAGNOSIS — Z13.0 SCREENING FOR DEFICIENCY ANEMIA: ICD-10-CM

## 2020-09-03 DIAGNOSIS — E55.9 VITAMIN D DEFICIENCY: ICD-10-CM

## 2020-09-03 DIAGNOSIS — Z13.1 SCREENING FOR DIABETES MELLITUS: ICD-10-CM

## 2020-09-03 DIAGNOSIS — E78.00 HYPERCHOLESTEREMIA: ICD-10-CM

## 2020-09-03 LAB
25(OH)D3 SERPL-MCNC: 30.4 NG/ML (ref 30–100)
ALBUMIN SERPL-MCNC: 3.6 G/DL (ref 3.5–5)
ALBUMIN/GLOB SERPL: 0.9 {RATIO} (ref 1.1–2.2)
ALP SERPL-CCNC: 60 U/L (ref 45–117)
ALT SERPL-CCNC: 14 U/L (ref 12–78)
ANION GAP SERPL CALC-SCNC: 6 MMOL/L (ref 5–15)
AST SERPL-CCNC: 11 U/L (ref 15–37)
BASOPHILS # BLD: 0 K/UL (ref 0–0.1)
BASOPHILS NFR BLD: 0 % (ref 0–1)
BILIRUB SERPL-MCNC: 1 MG/DL (ref 0.2–1)
BUN SERPL-MCNC: 15 MG/DL (ref 6–20)
BUN/CREAT SERPL: 16 (ref 12–20)
CALCIUM SERPL-MCNC: 9.6 MG/DL (ref 8.5–10.1)
CHLORIDE SERPL-SCNC: 108 MMOL/L (ref 97–108)
CHOLEST SERPL-MCNC: 256 MG/DL
CO2 SERPL-SCNC: 27 MMOL/L (ref 21–32)
CREAT SERPL-MCNC: 0.94 MG/DL (ref 0.55–1.02)
DIFFERENTIAL METHOD BLD: NORMAL
EOSINOPHIL # BLD: 0.3 K/UL (ref 0–0.4)
EOSINOPHIL NFR BLD: 4 % (ref 0–7)
ERYTHROCYTE [DISTWIDTH] IN BLOOD BY AUTOMATED COUNT: 14.1 % (ref 11.5–14.5)
EST. AVERAGE GLUCOSE BLD GHB EST-MCNC: 111 MG/DL
GLOBULIN SER CALC-MCNC: 3.9 G/DL (ref 2–4)
GLUCOSE SERPL-MCNC: 91 MG/DL (ref 65–100)
HBA1C MFR BLD: 5.5 % (ref 4–5.6)
HCT VFR BLD AUTO: 42.9 % (ref 35–47)
HDLC SERPL-MCNC: 71 MG/DL
HDLC SERPL: 3.6 {RATIO} (ref 0–5)
HGB BLD-MCNC: 13.3 G/DL (ref 11.5–16)
IMM GRANULOCYTES # BLD AUTO: 0 K/UL (ref 0–0.04)
IMM GRANULOCYTES NFR BLD AUTO: 0 % (ref 0–0.5)
LDLC SERPL CALC-MCNC: 170.2 MG/DL (ref 0–100)
LIPID PROFILE,FLP: ABNORMAL
LYMPHOCYTES # BLD: 1.4 K/UL (ref 0.8–3.5)
LYMPHOCYTES NFR BLD: 18 % (ref 12–49)
MCH RBC QN AUTO: 30 PG (ref 26–34)
MCHC RBC AUTO-ENTMCNC: 31 G/DL (ref 30–36.5)
MCV RBC AUTO: 96.8 FL (ref 80–99)
MONOCYTES # BLD: 0.5 K/UL (ref 0–1)
MONOCYTES NFR BLD: 7 % (ref 5–13)
NEUTS SEG # BLD: 5.5 K/UL (ref 1.8–8)
NEUTS SEG NFR BLD: 71 % (ref 32–75)
NRBC # BLD: 0 K/UL (ref 0–0.01)
NRBC BLD-RTO: 0 PER 100 WBC
PLATELET # BLD AUTO: 271 K/UL (ref 150–400)
PMV BLD AUTO: 9.6 FL (ref 8.9–12.9)
POTASSIUM SERPL-SCNC: 4.4 MMOL/L (ref 3.5–5.1)
PROT SERPL-MCNC: 7.5 G/DL (ref 6.4–8.2)
RBC # BLD AUTO: 4.43 M/UL (ref 3.8–5.2)
SODIUM SERPL-SCNC: 141 MMOL/L (ref 136–145)
TRIGL SERPL-MCNC: 74 MG/DL (ref ?–150)
VLDLC SERPL CALC-MCNC: 14.8 MG/DL
WBC # BLD AUTO: 7.8 K/UL (ref 3.6–11)

## 2020-09-03 NOTE — TELEPHONE ENCOUNTER
Pt getting lab work done 9/3/2020 and stated wants copy of labs mailed to her. Stated this does not happen and has to keep calling office to have this done.

## 2020-11-05 ENCOUNTER — TELEPHONE (OUTPATIENT)
Dept: FAMILY MEDICINE CLINIC | Age: 80
End: 2020-11-05

## 2020-11-05 NOTE — TELEPHONE ENCOUNTER
Spoke to pt and stated that she should make an appointment to discuss this. I also said she could try a supplement specifically for hair. She said she will call in am to make an appointment.

## 2020-11-10 ENCOUNTER — VIRTUAL VISIT (OUTPATIENT)
Dept: FAMILY MEDICINE CLINIC | Age: 80
End: 2020-11-10
Payer: MEDICARE

## 2020-11-10 DIAGNOSIS — L67.9 HAIR CHANGES: Primary | ICD-10-CM

## 2020-11-10 DIAGNOSIS — R53.81 MALAISE AND FATIGUE: ICD-10-CM

## 2020-11-10 DIAGNOSIS — R53.83 MALAISE AND FATIGUE: ICD-10-CM

## 2020-11-10 PROCEDURE — 99442 PR PHYS/QHP TELEPHONE EVALUATION 11-20 MIN: CPT | Performed by: INTERNAL MEDICINE

## 2020-11-10 NOTE — PROGRESS NOTES
Chief Complaint   Patient presents with    Hair/Scalp Problem     concern about thin, dry and breaking hair recently. Rao Brunson is a 78 y.o. female who was seen by synchronous (real-time) AUDIO technology on 11/10/2020 for Hair/Scalp Problem (concern about thin, dry and breaking hair recently. )        Assessment & Plan:   Diagnoses and all orders for this visit:    1. Hair changes  -     TSH 3RD GENERATION; Future  -     T3 UPTAKE PROFILE; Future  -     T4 (THYROXINE); Future    2. Malaise and fatigue  -     TSH 3RD GENERATION; Future  -     T3 UPTAKE PROFILE; Future  -     T4 (THYROXINE); Future    I spent at least 15 minutes on this visit with this established patient. Subjective:   80F who has recently been concerned with dry and brittle hair and not sure what to do to improve it. Her thyroid function has been normal in the past and she is not on any thyroid medication. In addition, she reports no change in the hair products that she uses and she has stopped using a rinse in her hair. She denies alopecia or bald patches. This has been on going for quite sometime. We discussed the limitations of a virtual visit to evaluate this, but discussed products that may help. There is anecdotal evidence that Biotin 2500 mcgs can be helpful. Suggested washing hair with products free of sulfates and keratin as these can strip natural hair of protein and increase dryness and the sensation of the hair being brittle. Suggested a natural product like Lucia Emily hair rinse with quincy and also [employer] Pear conditioner to see if this can add moisture back into her hair. She will let me know how this works. She declines seeing a dermatologist at this time. I also suggested rechecking TSH panel as well.        Patient Active Problem List    Diagnosis Date Noted    Obesity, morbid ([address] 04/30/2018    Vitamin D deficiency 12/15/2011    Symptomatic menopausal or female climacteric states 06/06/2011    Hypercholesteremia 06/08/2009     Current Outpatient Medications   Medication Sig Dispense Refill    omeprazole (PRILOSEC) 20 mg capsule TAKE 1 CAPSULE BY MOUTH EVERY DAY 90 Cap 1    Naproxen-Esomeprazole Mag (VIMOVO) 500-20 mg TbID Take 1 Tab by mouth.  folic acid (FOLVITE) 1 mg tablet Take  by mouth daily.  cholecalciferol, vitamin D3, (VITAMIN D3) 2,000 unit tab Take  by mouth.  ALPHAGAN P 0.1 % ophthalmic solution PLACE 1 DROP IN BOTH EYES TWICE A DAY DISPENSE 15ML BOTTLE  1    pyridoxine, vitamin B6, (VITAMIN B-6) 100 mg tablet Take 1 Tab by mouth daily. 30 Tab 3    cyanocobalamin (VITAMIN B-12) 1,000 mcg sublingual tablet Take 1 tablet by mouth daily. 30 tablet 3    calcium carbonate (TUMS) 200 mg calcium (500 mg) Chew Take 1 Tab by mouth daily. Allergies   Allergen Reactions    Latex Other (comments)     Patient is allergic to the powder in the gloves. Itchy eyes and rash. Past Medical History:   Diagnosis Date    Arthritis     Hypercholesterolemia      Past Surgical History:   Procedure Laterality Date    HX ORTHOPAEDIC      ankle     Family History   Problem Relation Age of Onset    Breast Cancer Mother 54    No Known Problems Father      Social History     Tobacco Use    Smoking status: Never Smoker    Smokeless tobacco: Never Used   Substance Use Topics    Alcohol use: Yes     Alcohol/week: 0.0 standard drinks     Comment: occasional       Review of Systems   Constitutional: Negative. HENT: Negative. Eyes: Negative. Respiratory: Negative. Cardiovascular: Negative. Gastrointestinal: Negative. Genitourinary: Negative. Musculoskeletal: Negative. Skin: Negative. Neurological: Negative. Endo/Heme/Allergies: Negative for environmental allergies and polydipsia. Does not bruise/bleed easily. Hair is brittle and dry and breaking. Psychiatric/Behavioral: Negative.     All other systems reviewed and are negative. Objective: There are no vital signs taken for this visit. General: alert, cooperative, no distress   Mental  status: normal mood, behavior, speech and thought processes, able to follow commands   Psychiatric: normal affect, consistent with stated mood, no evidence of hallucinations       We discussed the expected course, resolution and complications of the diagnosis(es) in detail. Medication risks, benefits, costs, interactions, and alternatives were discussed as indicated. I advised her to contact the office if her condition worsens, changes or fails to improve as anticipated. She expressed understanding with the diagnosis(es) and plan. Pato Alejandro, who was evaluated through a patient-initiated, synchronous (real-time) AUDIO encounter, and/or her healthcare decision maker, is aware that it is a billable service, with coverage as determined by her insurance carrier. She provided verbal consent to proceed: Yes, and patient identification was verified. It was conducted pursuant to the emergency declaration under the [facility] [facility], [address] [place] [address] authority and the [employer] and [employer] General Act. A caregiver was present when appropriate. Ability to conduct physical exam was limited. I was in the office. The patient was at home. Follow-up and Dispositions    · Return if symptoms worsen or fail to improve.          Martín Childress MD

## 2021-01-12 ENCOUNTER — TRANSCRIBE ORDER (OUTPATIENT)
Dept: SCHEDULING | Age: 81
End: 2021-01-12

## 2021-01-12 DIAGNOSIS — Z12.31 VISIT FOR SCREENING MAMMOGRAM: Primary | ICD-10-CM

## 2021-03-09 ENCOUNTER — HOSPITAL ENCOUNTER (OUTPATIENT)
Dept: MAMMOGRAPHY | Age: 81
Discharge: HOME | End: 2021-03-09
Attending: INTERNAL MEDICINE
Payer: MEDICARE

## 2021-03-09 DIAGNOSIS — Z12.31 VISIT FOR SCREENING MAMMOGRAM: ICD-10-CM

## 2021-03-09 PROCEDURE — 77067 SCR MAMMO BI INCL CAD: CPT

## 2021-08-23 ENCOUNTER — TELEPHONE (OUTPATIENT)
Dept: FAMILY MEDICINE CLINIC | Age: 81
End: 2021-08-23

## 2021-08-23 NOTE — TELEPHONE ENCOUNTER
----- Message from Radha Dunn sent at 8/23/2021  9:59 AM EDT -----  Regarding: Dr. Franca Park  General Message/Vendor Calls    Caller's first and last name: Pt       Reason for call: States needs a referral to a podiatrist. Patient states she seen one a couple years ago but has misplaced the information and cannot remember the name of the podiatrist.       Antonio Suero required yes/no and why: yes, to discuss       Best contact number(s): [phone]      Details to clarify the request: N/A       Radha Dunn

## 2021-08-23 NOTE — TELEPHONE ENCOUNTER
Patient called back and stated that she figured out who it was that she had seen previously.  Is going to be going to [facility] foot [facility] ankle [facility]

## 2021-10-27 ENCOUNTER — OFFICE VISIT (OUTPATIENT)
Dept: FAMILY MEDICINE CLINIC | Age: 81
End: 2021-10-27
Payer: MEDICARE

## 2021-10-27 VITALS
TEMPERATURE: 97.9 F | OXYGEN SATURATION: 97 % | WEIGHT: 244 LBS | SYSTOLIC BLOOD PRESSURE: 120 MMHG | BODY MASS INDEX: 36.98 KG/M2 | HEIGHT: 68 IN | HEART RATE: 94 BPM | RESPIRATION RATE: 20 BRPM | DIASTOLIC BLOOD PRESSURE: 60 MMHG

## 2021-10-27 DIAGNOSIS — R53.81 MALAISE AND FATIGUE: ICD-10-CM

## 2021-10-27 DIAGNOSIS — R53.83 MALAISE AND FATIGUE: ICD-10-CM

## 2021-10-27 DIAGNOSIS — R73.09 ELEVATED GLUCOSE: ICD-10-CM

## 2021-10-27 DIAGNOSIS — E78.00 HYPERCHOLESTEREMIA: ICD-10-CM

## 2021-10-27 DIAGNOSIS — E55.9 VITAMIN D DEFICIENCY: ICD-10-CM

## 2021-10-27 DIAGNOSIS — Z00.00 MEDICARE ANNUAL WELLNESS VISIT, SUBSEQUENT: Primary | ICD-10-CM

## 2021-10-27 PROCEDURE — G8427 DOCREV CUR MEDS BY ELIG CLIN: HCPCS | Performed by: INTERNAL MEDICINE

## 2021-10-27 PROCEDURE — G8417 CALC BMI ABV UP PARAM F/U: HCPCS | Performed by: INTERNAL MEDICINE

## 2021-10-27 PROCEDURE — G8510 SCR DEP NEG, NO PLAN REQD: HCPCS | Performed by: INTERNAL MEDICINE

## 2021-10-27 PROCEDURE — 1090F PRES/ABSN URINE INCON ASSESS: CPT | Performed by: INTERNAL MEDICINE

## 2021-10-27 PROCEDURE — 1101F PT FALLS ASSESS-DOCD LE1/YR: CPT | Performed by: INTERNAL MEDICINE

## 2021-10-27 PROCEDURE — G8399 PT W/DXA RESULTS DOCUMENT: HCPCS | Performed by: INTERNAL MEDICINE

## 2021-10-27 PROCEDURE — 99214 OFFICE O/P EST MOD 30 MIN: CPT | Performed by: INTERNAL MEDICINE

## 2021-10-27 PROCEDURE — G0439 PPPS, SUBSEQ VISIT: HCPCS | Performed by: INTERNAL MEDICINE

## 2021-10-27 PROCEDURE — G8536 NO DOC ELDER MAL SCRN: HCPCS | Performed by: INTERNAL MEDICINE

## 2021-10-27 NOTE — PATIENT INSTRUCTIONS
Well Visit, Over 72: Care Instructions  Overview     Well visits can help you stay healthy. Your doctor has checked your overall health and may have suggested ways to take good care of yourself. Your doctor also may have recommended tests. At home, you can help prevent illness with healthy eating, regular exercise, and other steps. Follow-up care is a key part of your treatment and safety. Be sure to make and go to all appointments, and call your doctor if you are having problems. It's also a good idea to know your test results and keep a list of the medicines you take. How can you care for yourself at home? · Get screening tests that you and your doctor decide on. Screening helps find diseases before any symptoms appear. · Eat healthy foods. Choose fruits, vegetables, whole grains, protein, and low-fat dairy foods. Limit fat, especially saturated fat. Reduce salt in your diet. · Limit alcohol. If you are a man, have no more than 2 drinks a day or 14 drinks a week. If you are a woman, have no more than 1 drink a day or 7 drinks a week. Since alcohol affects older adults differently, you may want to limit alcohol even more. Or you may not want to drink at all. · Get at least 30 minutes of exercise on most days of the week. Walking is a good choice. You also may want to do other activities, such as running, swimming, cycling, or playing tennis or team sports. · Reach and stay at a healthy weight. This will lower your risk for many problems, such as obesity, diabetes, heart disease, and high blood pressure. · Do not smoke. Smoking can make health problems worse. If you need help quitting, talk to your doctor about stop-smoking programs and medicines. These can increase your chances of quitting for good. · Care for your mental health. It is easy to get weighed down by worry and stress. Learn strategies to manage stress, like deep breathing and mindfulness, and stay connected with your family and community. If you find you often feel sad or hopeless, talk with your doctor. Treatment can help. · Talk to your doctor about whether you have any risk factors for sexually transmitted infections (STIs). You can help prevent STIs if you wait to have sex with a new partner (or partners) until you've each been tested for STIs. It also helps if you use condoms (male or female condoms) and if you limit your sex partners to one person who only has sex with you. Vaccines are available for some STIs. · If you think you may have a problem with alcohol or drug use, talk to your doctor. This includes prescription medicines (such as amphetamines and opioids) and illegal drugs (such as cocaine and methamphetamine). Your doctor can help you figure out what type of treatment is best for you. · Protect your skin from too much sun. When you're outdoors from 10 a.m. to 4 p.m., stay in the shade or cover up with clothing and a hat with a wide brim. Wear sunglasses that block UV rays. Even when it's cloudy, put broad-spectrum sunscreen (SPF 30 or higher) on any exposed skin. · See a dentist one or two times a year for checkups and to have your teeth cleaned. · Wear a seat belt in the car. When should you call for help? Watch closely for changes in your health, and be sure to contact your doctor if you have any problems or symptoms that concern you. Where can you learn more? Go to http://www.gray.com/  Enter [id number] in the search box to learn more about \"Well Visit, Over 65: Care Instructions. \"  Current as of: February 11, 2021               Content Version: 13.0  © 1257-6078 [employer]. Care instructions adapted under license by [employer] (which disclaims liability or warranty for this information).  If you have questions about a medical condition or this instruction, always ask your healthcare professional. [address] any warranty or liability for your use of this information.

## 2021-10-27 NOTE — PROGRESS NOTES
Identified pt with two pt identifiers(name and ). Chief Complaint   Patient presents with    Annual Wellness Visit     not fasting    Hypertension        Health Maintenance Due   Topic    Shingrix Vaccine Age 49> (1 of 2)    Pneumococcal 65+ years (1 of 1 - PPSV23)    Medicare Yearly Exam        Wt Readings from Last 3 Encounters:   10/27/21 244 lb (110.7 kg)   20 252 lb 6.4 oz (114.5 kg)   19 251 lb 6.4 oz (114 kg)     Temp Readings from Last 3 Encounters:   10/27/21 97.9 °F (36.6 °C) (Temporal)   20 98.8 °F (37.1 °C) (Oral)   19 97.6 °F (36.4 °C) (Oral)     BP Readings from Last 3 Encounters:   10/27/21 (!) 142/74   20 128/76   19 110/66     Pulse Readings from Last 3 Encounters:   10/27/21 94   20 80   19 60         Learning Assessment:  :     Learning Assessment 2015 3/11/2014   PRIMARY LEARNER Patient Patient   HIGHEST LEVEL OF EDUCATION - PRIMARY LEARNER  GRADUATED HIGH SCHOOL OR GED GRADUATED HIGH SCHOOL OR GED   BARRIERS PRIMARY LEARNER NONE NONE   CO-LEARNER CAREGIVER No No   PRIMARY LANGUAGE ENGLISH ENGLISH   LEARNER PREFERENCE PRIMARY LISTENING READING     - VIDEOS     - DEMONSTRATION   ANSWERED BY Patient patient   RELATIONSHIP SELF SELF       Depression Screening:  :     3 most recent PHQ Screens 10/27/2021   Little interest or pleasure in doing things Not at all   Feeling down, depressed, irritable, or hopeless Not at all   Total Score PHQ 2 0       Fall Risk Assessment:  :     Fall Risk Assessment, last 12 mths 10/27/2021   Able to walk? Yes   Fall in past 12 months? 0   Do you feel unsteady? 1   Are you worried about falling 1   Is the gait abnormal? 1   Number of falls in past 12 months 0   Fall with injury? -       Abuse Screening:  :     Abuse Screening Questionnaire 10/27/2021 2020 2017 10/20/2016 10/14/2014 3/11/2014   Do you ever feel afraid of your partner?  N N N N N N   Are you in a relationship with someone who physically or mentally threatens you? N N N N N N   Is it safe for you to go home? Y Y Y Y Y Y       Coordination of Care Questionnaire:  :     1) Have you been to an emergency room, urgent care clinic since your last visit? no   Hospitalized since your last visit? no             2) Have you seen or consulted any other health care providers outside of [address] [place] [address] since your last visit? no  (Include any pap smears or colon screenings in this section.)    3) Do you have an Advance Directive on file? no  Are you interested in receiving information about Advance Directives? no    Reviewed record in preparation for visit and have obtained necessary documentation.

## 2021-10-27 NOTE — PROGRESS NOTES
Chief Complaint   Patient presents with    Annual Wellness Visit     not fasting    Hypertension     This is the Subsequent Medicare Annual Wellness Exam, performed 12 months or more after the Initial AWV or the last Subsequent AWV    I have reviewed the patient's medical history in detail and updated the computerized patient record. Very pleasant 81F who presents for her AWV. She has been doing well. Still has c/o joint pain. Seeing Dr. Mj Sinha for her knees and he has given her KEIRA. We discussed the role of water aerobics and jordan chi and information given for this. She is UTD with her immunizations. No recent ER or hospital visits. She would like to get fasting labs. She will return for these in the fasting state. Assessment/Plan   Education and counseling provided:  Are appropriate based on today's review and evaluation  End-of-Life planning (with patient's consent)  Pneumococcal Vaccine  Influenza Vaccine  Screening Mammography  Cardiovascular screening blood test  Bone mass measurement (DEXA)  Screening for glaucoma  Diabetes screening test     Diagnoses and all orders for this visit:    1. Medicare annual wellness visit, subsequent  Anticipatory guidance discussed. Immunizations reviewed  HM updated. 2. Malaise and fatigue  -     TSH 3RD GENERATION; Future  -     T3 UPTAKE PROFILE; Future  -     T4 (THYROXINE); Future    3. Vitamin D deficiency  -     VITAMIN D, 25 HYDROXY; Future    4. Hypercholesteremia  -     METABOLIC PANEL, COMPREHENSIVE; Future  -     CBC WITH AUTOMATED DIFF; Future  -     LIPID PANEL; Future    5. Elevated glucose  -     HEMOGLOBIN A1C WITH EAG; Future    I have discussed the diagnosis with the patient and the intended treatment plan as seen in the above orders. The patient has received an after-visit summary and questions were answered concerning future plans. Asked to return should symptoms worsen or not improve with treatment.  Any pending labs and studies will be relayed to patient when they become available. Pt verbalizes understanding of plan of care and denies further questions or concerns at this time. Follow-up and Dispositions    · Return in about 1 year (around 10/27/2022), or if symptoms worsen or fail to improve, for Follow up 6 months for chronic issues. Depression Risk Factor Screening     3 most recent PHQ Screens 10/27/2021   Little interest or pleasure in doing things Not at all   Feeling down, depressed, irritable, or hopeless Not at all   Total Score PHQ 2 0       Alcohol Risk Screen    Do you average more than 1 drink per night or more than 7 drinks a week:  No    On any one occasion in the past three months have you have had more than 3 drinks containing alcohol:  No        Functional Ability and Level of Safety    Hearing: Hearing is good. Activities of Daily Living: The home contains: no safety equipment. Patient does total self care      Ambulation: with mild difficulty     Fall Risk:  Fall Risk Assessment, last 12 mths 10/27/2021   Able to walk? Yes   Fall in past 12 months? 0   Do you feel unsteady? 1   Are you worried about falling 1   Is the gait abnormal? 1   Number of falls in past 12 months 0   Fall with injury? -      Abuse Screen:  Patient is not abused       Cognitive Screening    Has your family/caregiver stated any concerns about your memory: no  Cognitive Screening: Normal - Clock Drawing Test, Mini Cog Test       Health Maintenance Due   There are no preventive care reminders to display for this patient.     Patient Care Team   Patient Care Team:  Axel Carey MD as PCP - General (Pediatric Medicine)  Axel Carey MD as PCP - Zana Edwards Provider    History     Patient Active Problem List   Diagnosis Code    Hypercholesteremia E78.00    Symptomatic menopausal or female climacteric states N95.1    Vitamin D deficiency E55.9    Obesity, morbid ([facility] [address] E66.01     Past Medical History:   Diagnosis Date    Arthritis     Hypercholesterolemia       Past Surgical History:   Procedure Laterality Date    HX ORTHOPAEDIC      ankle     Current Outpatient Medications   Medication Sig Dispense Refill    folic acid (FOLVITE) 1 mg tablet Take  by mouth daily.  cholecalciferol, vitamin D3, (VITAMIN D3) 2,000 unit tab Take  by mouth.  ALPHAGAN P 0.1 % ophthalmic solution PLACE 1 DROP IN BOTH EYES TWICE A DAY DISPENSE 15ML BOTTLE  1    pyridoxine, vitamin B6, (VITAMIN B-6) 100 mg tablet Take 1 Tab by mouth daily. 30 Tab 3    cyanocobalamin (VITAMIN B-12) 1,000 mcg sublingual tablet Take 1 tablet by mouth daily. 30 tablet 3    calcium carbonate (TUMS) 200 mg calcium (500 mg) Chew Take 1 Tab by mouth daily.  omeprazole (PRILOSEC) 20 mg capsule TAKE 1 CAPSULE BY MOUTH EVERY DAY (Patient not taking: Reported on 10/27/2021) 90 Cap 1    Naproxen-Esomeprazole Mag (VIMOVO) 500-20 mg TbID Take 1 Tab by mouth. (Patient not taking: Reported on 10/27/2021)       Allergies   Allergen Reactions    Latex Other (comments)     Patient is allergic to the powder in the gloves. Itchy eyes and rash. Family History   Problem Relation Age of Onset    Breast Cancer Mother 54    No Known Problems Father      Social History     Tobacco Use    Smoking status: Never Smoker    Smokeless tobacco: Never Used   Substance Use Topics    Alcohol use:  Yes     Alcohol/week: 0.0 standard drinks     Comment: occasional         Papi Greene MD

## 2021-11-02 ENCOUNTER — LAB ONLY (OUTPATIENT)
Dept: FAMILY MEDICINE CLINIC | Age: 81
End: 2021-11-02

## 2021-11-02 DIAGNOSIS — R53.83 MALAISE AND FATIGUE: ICD-10-CM

## 2021-11-02 DIAGNOSIS — R73.09 ELEVATED GLUCOSE: ICD-10-CM

## 2021-11-02 DIAGNOSIS — Z00.00 MEDICARE ANNUAL WELLNESS VISIT, SUBSEQUENT: ICD-10-CM

## 2021-11-02 DIAGNOSIS — R53.81 MALAISE AND FATIGUE: ICD-10-CM

## 2021-11-02 DIAGNOSIS — E55.9 VITAMIN D DEFICIENCY: ICD-10-CM

## 2021-11-02 DIAGNOSIS — E78.00 HYPERCHOLESTEREMIA: ICD-10-CM

## 2021-11-02 LAB
25(OH)D3 SERPL-MCNC: 22.7 NG/ML (ref 30–100)
ALBUMIN SERPL-MCNC: 3.4 G/DL (ref 3.5–5)
ALBUMIN/GLOB SERPL: 0.8 {RATIO} (ref 1.1–2.2)
ALP SERPL-CCNC: 66 U/L (ref 45–117)
ALT SERPL-CCNC: 18 U/L (ref 12–78)
ANION GAP SERPL CALC-SCNC: 5 MMOL/L (ref 5–15)
AST SERPL-CCNC: 14 U/L (ref 15–37)
BASOPHILS # BLD: 0 K/UL (ref 0–0.1)
BASOPHILS NFR BLD: 1 % (ref 0–1)
BILIRUB SERPL-MCNC: 0.9 MG/DL (ref 0.2–1)
BUN SERPL-MCNC: 11 MG/DL (ref 6–20)
BUN/CREAT SERPL: 12 (ref 12–20)
CALCIUM SERPL-MCNC: 9.1 MG/DL (ref 8.5–10.1)
CHLORIDE SERPL-SCNC: 106 MMOL/L (ref 97–108)
CHOLEST SERPL-MCNC: 258 MG/DL
CO2 SERPL-SCNC: 26 MMOL/L (ref 21–32)
CREAT SERPL-MCNC: 0.89 MG/DL (ref 0.55–1.02)
DIFFERENTIAL METHOD BLD: NORMAL
EOSINOPHIL # BLD: 0.4 K/UL (ref 0–0.4)
EOSINOPHIL NFR BLD: 4 % (ref 0–7)
ERYTHROCYTE [DISTWIDTH] IN BLOOD BY AUTOMATED COUNT: 13.4 % (ref 11.5–14.5)
EST. AVERAGE GLUCOSE BLD GHB EST-MCNC: 120 MG/DL
GLOBULIN SER CALC-MCNC: 4.2 G/DL (ref 2–4)
GLUCOSE SERPL-MCNC: 114 MG/DL (ref 65–100)
HBA1C MFR BLD: 5.8 % (ref 4–5.6)
HCT VFR BLD AUTO: 42.1 % (ref 35–47)
HDLC SERPL-MCNC: 70 MG/DL
HDLC SERPL: 3.7 {RATIO} (ref 0–5)
HGB BLD-MCNC: 13.5 G/DL (ref 11.5–16)
IMM GRANULOCYTES # BLD AUTO: 0 K/UL (ref 0–0.04)
IMM GRANULOCYTES NFR BLD AUTO: 0 % (ref 0–0.5)
LDLC SERPL CALC-MCNC: 169.2 MG/DL (ref 0–100)
LYMPHOCYTES # BLD: 1.4 K/UL (ref 0.8–3.5)
LYMPHOCYTES NFR BLD: 17 % (ref 12–49)
MCH RBC QN AUTO: 29.6 PG (ref 26–34)
MCHC RBC AUTO-ENTMCNC: 32.1 G/DL (ref 30–36.5)
MCV RBC AUTO: 92.3 FL (ref 80–99)
MONOCYTES # BLD: 0.5 K/UL (ref 0–1)
MONOCYTES NFR BLD: 6 % (ref 5–13)
NEUTS SEG # BLD: 5.9 K/UL (ref 1.8–8)
NEUTS SEG NFR BLD: 72 % (ref 32–75)
NRBC # BLD: 0 K/UL (ref 0–0.01)
NRBC BLD-RTO: 0 PER 100 WBC
PLATELET # BLD AUTO: 320 K/UL (ref 150–400)
PMV BLD AUTO: 9.7 FL (ref 8.9–12.9)
POTASSIUM SERPL-SCNC: 4.3 MMOL/L (ref 3.5–5.1)
PROT SERPL-MCNC: 7.6 G/DL (ref 6.4–8.2)
RBC # BLD AUTO: 4.56 M/UL (ref 3.8–5.2)
SODIUM SERPL-SCNC: 137 MMOL/L (ref 136–145)
T4 SERPL-MCNC: 10.8 UG/DL (ref 4.8–13.9)
TRIGL SERPL-MCNC: 94 MG/DL (ref ?–150)
TSH SERPL DL<=0.05 MIU/L-ACNC: 1.04 UIU/ML (ref 0.36–3.74)
VLDLC SERPL CALC-MCNC: 18.8 MG/DL
WBC # BLD AUTO: 8.3 K/UL (ref 3.6–11)

## 2021-11-03 LAB — T3RU NFR SERPL: 26 % (ref 24–39)

## 2021-11-08 ENCOUNTER — TELEPHONE (OUTPATIENT)
Dept: FAMILY MEDICINE CLINIC | Age: 81
End: 2021-11-08

## 2021-11-08 NOTE — TELEPHONE ENCOUNTER
----- Message from Lolita Bazan sent at 11/8/2021  2:13 PM EST -----  Subject: Message to Provider    QUESTIONS  Information for Provider? Patient would like lab results   ---------------------------------------------------------------------------  --------------  CALL BACK INFO  What is the best way for the office to contact you? OK to leave message on   voicemail  Preferred Call Back Phone Number? [phone]  ---------------------------------------------------------------------------  --------------  SCRIPT ANSWERS  Relationship to Patient?  Self

## 2021-11-08 NOTE — TELEPHONE ENCOUNTER
Spoke to pt and went over lab results. She understood. I also let her know a letter with all this information was on the way to her house.

## 2021-12-30 ENCOUNTER — TELEPHONE (OUTPATIENT)
Dept: FAMILY MEDICINE CLINIC | Age: 81
End: 2021-12-30

## 2021-12-30 DIAGNOSIS — E78.00 HYPERCHOLESTEREMIA: Primary | ICD-10-CM

## 2021-12-30 RX ORDER — ROSUVASTATIN CALCIUM 5 MG/1
5 TABLET, COATED ORAL
Qty: 30 TABLET | Refills: 5 | Status: SHIPPED | OUTPATIENT
Start: 2021-12-30 | End: 2022-05-25 | Stop reason: SDUPTHER

## 2021-12-30 NOTE — TELEPHONE ENCOUNTER
----- Message from Talisha Adam sent at 12/30/2021 11:10 AM EST -----  Subject: Message to Provider    QUESTIONS  Information for Provider? Patient would like to speak with the nurse about   her statin. Patient is thinking about taking the statin. Please return   call and advise .  ---------------------------------------------------------------------------  --------------  CALL BACK INFO  What is the best way for the office to contact you? OK to leave message on   voicemail  Preferred Call Back Phone Number? [phone]  ---------------------------------------------------------------------------  --------------  SCRIPT ANSWERS  Relationship to Patient?  Self

## 2021-12-30 NOTE — TELEPHONE ENCOUNTER
Spoke to pt and she is willing to start on the lowest dose statin at this time. Crestor 5mg as been sent to pharmacy.

## 2022-03-18 PROBLEM — E66.01 OBESITY, MORBID (HCC): Status: ACTIVE | Noted: 2018-04-30

## 2022-04-15 ENCOUNTER — TRANSCRIBE ORDER (OUTPATIENT)
Dept: SCHEDULING | Age: 82
End: 2022-04-15

## 2022-04-15 DIAGNOSIS — Z12.31 VISIT FOR SCREENING MAMMOGRAM: Primary | ICD-10-CM

## 2022-05-25 ENCOUNTER — OFFICE VISIT (OUTPATIENT)
Dept: FAMILY MEDICINE CLINIC | Age: 82
End: 2022-05-25
Payer: MEDICARE

## 2022-05-25 VITALS
TEMPERATURE: 98.6 F | DIASTOLIC BLOOD PRESSURE: 74 MMHG | HEIGHT: 67 IN | HEART RATE: 89 BPM | WEIGHT: 236 LBS | RESPIRATION RATE: 20 BRPM | SYSTOLIC BLOOD PRESSURE: 138 MMHG | BODY MASS INDEX: 37.04 KG/M2 | OXYGEN SATURATION: 98 %

## 2022-05-25 DIAGNOSIS — E66.01 SEVERE OBESITY (BMI 35.0-39.9) WITH COMORBIDITY (HCC): ICD-10-CM

## 2022-05-25 DIAGNOSIS — E78.00 HYPERCHOLESTEREMIA: ICD-10-CM

## 2022-05-25 DIAGNOSIS — H25.9 SENILE CATARACT OF LEFT EYE, UNSPECIFIED AGE-RELATED CATARACT TYPE: Primary | ICD-10-CM

## 2022-05-25 PROCEDURE — G8427 DOCREV CUR MEDS BY ELIG CLIN: HCPCS | Performed by: FAMILY MEDICINE

## 2022-05-25 PROCEDURE — 99213 OFFICE O/P EST LOW 20 MIN: CPT | Performed by: FAMILY MEDICINE

## 2022-05-25 PROCEDURE — G8417 CALC BMI ABV UP PARAM F/U: HCPCS | Performed by: FAMILY MEDICINE

## 2022-05-25 PROCEDURE — G8399 PT W/DXA RESULTS DOCUMENT: HCPCS | Performed by: FAMILY MEDICINE

## 2022-05-25 PROCEDURE — G8510 SCR DEP NEG, NO PLAN REQD: HCPCS | Performed by: FAMILY MEDICINE

## 2022-05-25 PROCEDURE — G8536 NO DOC ELDER MAL SCRN: HCPCS | Performed by: FAMILY MEDICINE

## 2022-05-25 PROCEDURE — 1101F PT FALLS ASSESS-DOCD LE1/YR: CPT | Performed by: FAMILY MEDICINE

## 2022-05-25 PROCEDURE — 1123F ACP DISCUSS/DSCN MKR DOCD: CPT | Performed by: FAMILY MEDICINE

## 2022-05-25 PROCEDURE — 1090F PRES/ABSN URINE INCON ASSESS: CPT | Performed by: FAMILY MEDICINE

## 2022-05-25 RX ORDER — HYALURONATE SOD, CROSS-LINKED 30 MG/3 ML
SYRINGE (ML) INTRAARTICULAR
COMMUNITY
Start: 2022-03-16

## 2022-05-25 RX ORDER — ROSUVASTATIN CALCIUM 5 MG/1
5 TABLET, COATED ORAL
Qty: 30 TABLET | Refills: 5
Start: 2022-05-25 | End: 2022-06-24

## 2022-05-25 NOTE — PROGRESS NOTES
Identified pt with two pt identifiers(name and ). Chief Complaint   Patient presents with    Pre-op Exam     Cataract surgery on         Health Maintenance Due   Topic    Shingrix Vaccine Age 50> (1 of 2)    COVID-19 Vaccine (3 - Booster for Erling Nipper series)       Wt Readings from Last 3 Encounters:   22 236 lb (107 kg)   10/27/21 244 lb (110.7 kg)   20 252 lb 6.4 oz (114.5 kg)     Temp Readings from Last 3 Encounters:   22 98.6 °F (37 °C) (Temporal)   10/27/21 97.9 °F (36.6 °C) (Temporal)   20 98.8 °F (37.1 °C) (Oral)     BP Readings from Last 3 Encounters:   22 138/74   10/27/21 120/60   20 128/76     Pulse Readings from Last 3 Encounters:   22 89   10/27/21 94   20 80         Learning Assessment:  :     Learning Assessment 2015 3/11/2014   PRIMARY LEARNER Patient Patient   HIGHEST LEVEL OF EDUCATION - PRIMARY LEARNER  GRADUATED HIGH SCHOOL OR GED GRADUATED HIGH SCHOOL OR GED   BARRIERS PRIMARY LEARNER NONE NONE   CO-LEARNER CAREGIVER No No   PRIMARY LANGUAGE ENGLISH ENGLISH   LEARNER PREFERENCE PRIMARY LISTENING READING     - VIDEOS     - DEMONSTRATION   ANSWERED BY Patient patient   RELATIONSHIP SELF SELF       Depression Screening:  :     3 most recent PHQ Screens 2022   Little interest or pleasure in doing things Not at all   Feeling down, depressed, irritable, or hopeless Not at all   Total Score PHQ 2 0       Fall Risk Assessment:  :     Fall Risk Assessment, last 12 mths 10/27/2021   Able to walk? Yes   Fall in past 12 months? 0   Do you feel unsteady? 1   Are you worried about falling 1   Is the gait abnormal? 1   Number of falls in past 12 months 0   Fall with injury? -       Abuse Screening:  :     Abuse Screening Questionnaire 2022 10/27/2021 2020 2017 10/20/2016 10/14/2014 3/11/2014   Do you ever feel afraid of your partner?  N N N N N N N   Are you in a relationship with someone who physically or mentally threatens you? N N N N N N N   Is it safe for you to go home? Y Y Y Y Y Y Y       Coordination of Care Questionnaire:  :     1) Have you been to an emergency room, urgent care clinic since your last visit? no   Hospitalized since your last visit? no             2) Have you seen or consulted any other health care providers outside of [address] [place] [address] since your last visit? no  (Include any pap smears or colon screenings in this section.)    3) Do you have an Advance Directive on file? no  Are you interested in receiving information about Advance Directives? no    Patient is accompanied by N/A I have received verbal consent from Abraham Leigh to discuss any/all medical information while they are present in the room. 4.  For patients aged 39-70: Has the patient had a colonoscopy / FIT/ Cologuard? NA - based on age      If the patient is female:    11. For patients aged 41-77: Has the patient had a mammogram within the past 2 years? NA - based on age or sex      10. For patients aged 21-65: Has the patient had a pap smear?  NA - based on age or sex

## 2022-05-27 NOTE — PROGRESS NOTES
Preoperative Evaluation    Date of Exam: 2022    Rosa Jonas is a 78 y.o. female (:1940) who presents for preoperative evaluation. Scheduled for L cataract surgery by Dr Deion Sin. Latex Allergy: yes    Problem List:     Patient Active Problem List    Diagnosis Date Noted    Obesity, morbid ([address] 2018    Vitamin D deficiency 12/15/2011    Symptomatic menopausal or female climacteric states 2011    Hypercholesteremia 2009     Medical History:     Past Medical History:   Diagnosis Date    Arthritis     Hypercholesterolemia      Allergies: Allergies   Allergen Reactions    Latex Other (comments)     Patient is allergic to the powder in the gloves. Itchy eyes and rash. Medications:     Current Outpatient Medications   Medication Sig    Gel-One 30 mg/3 mL syrg injection     rosuvastatin (CRESTOR) 5 mg tablet Take 1 Tablet by mouth nightly for 30 days.  folic acid (FOLVITE) 1 mg tablet Take  by mouth daily.  cholecalciferol, vitamin D3, (VITAMIN D3) 2,000 unit tab Take  by mouth.  ALPHAGAN P 0.1 % ophthalmic solution PLACE 1 DROP IN BOTH EYES TWICE A DAY DISPENSE 15ML BOTTLE    pyridoxine, vitamin B6, (VITAMIN B-6) 100 mg tablet Take 1 Tab by mouth daily.  cyanocobalamin (VITAMIN B-12) 1,000 mcg sublingual tablet Take 1 tablet by mouth daily.  calcium carbonate (TUMS) 200 mg calcium (500 mg) Chew Take 1 Tab by mouth daily. No current facility-administered medications for this visit. Surgical History:     Past Surgical History:   Procedure Laterality Date    HX ORTHOPAEDIC      ankle     Social History:     Social History     Socioeconomic History    Marital status:    Tobacco Use    Smoking status: Never Smoker    Smokeless tobacco: Never Used   Substance and Sexual Activity    Alcohol use:  Yes     Alcohol/week: 0.0 standard drinks     Comment: occasional    Drug use: No    Sexual activity: Never       Anesthesia Complications: None  History of abnormal bleeding : None  History of Blood Transfusions: no  Health Care Directive or Living Will: no    Objective:     ROS:   Feeling well. No dyspnea or chest pain on exertion. No abdominal pain, change in bowel habits, black or bloody stools. No urinary tract symptoms. GYN ROS: . No neurological complaints. OBJECTIVE:   The patient appears well, alert, oriented x 3, in no distress. Visit Vitals  /74 (BP 1 Location: Right arm, BP Patient Position: Sitting, BP Cuff Size: Adult)   Pulse 89   Temp 98.6 °F (37 °C) (Temporal)   Resp 20   Ht 5' 7\" (1.702 m)   Wt 236 lb (107 kg)   LMP 11/17/1991   SpO2 98%   BMI 36.96 kg/m²     HEENT:ENT normal.  Neck supple. No adenopathy or thyromegaly. GAURI. Chest: Lungs are clear, good air entry, no wheezes, rhonchi or rales. Cardiovascular: S1 and S2 normal, no murmurs, regular rate and rhythm. Abdomen: soft without tenderness, guarding, mass or organomegaly. Extremities: show no edema, normal peripheral pulses. Neurological: is normal, no focal findings. IMPRESSION:   Low risk for planned surgery  No contraindications to planned surgery.     Preop form completed and faxed to Eve Fofana MD   5/43/2726

## 2022-05-31 ENCOUNTER — HOSPITAL ENCOUNTER (OUTPATIENT)
Dept: MAMMOGRAPHY | Age: 82
Discharge: HOME | End: 2022-05-31
Attending: INTERNAL MEDICINE
Payer: MEDICARE

## 2022-05-31 DIAGNOSIS — Z12.31 VISIT FOR SCREENING MAMMOGRAM: ICD-10-CM

## 2022-05-31 PROCEDURE — 77067 SCR MAMMO BI INCL CAD: CPT

## 2022-06-01 NOTE — PROGRESS NOTES
Please let patient know that her mammogram was negative. Recommendation is to follow up as needed and repeat in 1 year.

## 2022-06-02 ENCOUNTER — TELEPHONE (OUTPATIENT)
Dept: FAMILY MEDICINE CLINIC | Age: 82
End: 2022-06-02

## 2022-06-02 NOTE — TELEPHONE ENCOUNTER
----- Message from Kelton Bacon MD sent at 6/1/2022  1:48 PM EDT -----  Please let patient know that her mammogram was negative. Recommendation is to follow up as needed and repeat in 1 year.

## 2023-02-22 ENCOUNTER — OFFICE VISIT (OUTPATIENT)
Dept: FAMILY MEDICINE CLINIC | Age: 83
End: 2023-02-22
Payer: MEDICARE

## 2023-02-22 VITALS
TEMPERATURE: 98 F | BODY MASS INDEX: 36.66 KG/M2 | RESPIRATION RATE: 16 BRPM | SYSTOLIC BLOOD PRESSURE: 122 MMHG | DIASTOLIC BLOOD PRESSURE: 64 MMHG | HEART RATE: 91 BPM | WEIGHT: 233.6 LBS | OXYGEN SATURATION: 98 % | HEIGHT: 67 IN

## 2023-02-22 DIAGNOSIS — E55.9 VITAMIN D DEFICIENCY: ICD-10-CM

## 2023-02-22 DIAGNOSIS — E78.00 HYPERCHOLESTEREMIA: ICD-10-CM

## 2023-02-22 DIAGNOSIS — Z00.00 MEDICARE ANNUAL WELLNESS VISIT, SUBSEQUENT: Primary | ICD-10-CM

## 2023-02-22 DIAGNOSIS — R73.09 ELEVATED GLUCOSE: ICD-10-CM

## 2023-02-22 DIAGNOSIS — E66.01 SEVERE OBESITY (BMI 35.0-39.9) WITH COMORBIDITY (HCC): ICD-10-CM

## 2023-02-22 NOTE — PROGRESS NOTES
CC:  Chief Complaint   Patient presents with    Annual Wellness Visit    Arthritis     Patient is having a lot of arthritis pain      This is the Subsequent Medicare Annual Wellness Exam, performed 12 months or more after the Initial AWV or the last Subsequent AWV    I have reviewed the patient's medical history in detail and updated the computerized patient record. 78 y.o. F who presents for this AWV. Concerns: arthritis and OA pain in knees primarily. She has been evaluated by Ortho in the past and she probably need replacement surgery, but would like to hold off for now. She takes Tylenol as needed and it helps. Once in awhile a NSAID. BP is well controlled. She is otherwise doing well. Assessment/Plan   Education and counseling provided:  Are appropriate based on today's review and evaluation  End-of-Life planning (with patient's consent)  Pneumococcal Vaccine  Influenza Vaccine  Hepatitis B Vaccine  Screening Mammography  Cardiovascular screening blood test  Bone mass measurement (DEXA)  Screening for glaucoma  Diabetes screening test    1. Medicare annual wellness visit, subsequent  Anticipatory guidance discussed. Immunizations reviewed  HM updated. 2. Hypercholesteremia  -     METABOLIC PANEL, COMPREHENSIVE; Future  -     CBC WITH AUTOMATED DIFF; Future  -     LIPID PANEL; Future  3. Elevated glucose  -     HEMOGLOBIN A1C WITH EAG; Future  4. Vitamin D deficiency  -     VITAMIN D, 25 HYDROXY; Future  5. Severe obesity (BMI 35.0-39. 9) with comorbidity ([address]   I have reviewed/discussed the above normal BMI with the patient. I have recommended the following interventions: dietary management education, guidance, and counseling, encourage exercise, and monitor weight . I have discussed the diagnosis with the patient and the intended treatment plan as seen in the above orders. The patient has received an after-visit summary and questions were answered concerning future plans.  Asked to return should symptoms worsen or not improve with treatment. Any pending labs and studies will be relayed to patient when they become available. Pt verbalizes understanding of plan of care and denies further questions or concerns at this time. Follow-up and Dispositions    Return in about 1 year (around 2/22/2024), or if symptoms worsen or fail to improve, for   Follow up 6 months for chronic issues. Depression Risk Factor Screening     3 most recent PHQ Screens 2/22/2023   Little interest or pleasure in doing things Not at all   Feeling down, depressed, irritable, or hopeless Not at all   Total Score PHQ 2 0       Alcohol & Drug Abuse Risk Screen    Do you average more than 1 drink per night or more than 7 drinks a week:  No  On any one occasion in the past three months have you have had more than 3 drinks containing alcohol:  No          Functional Ability and Level of Safety    Hearing: Hearing is good. Activities of Daily Living: The home contains: no safety equipment. Patient does total self care      Ambulation: with mild difficulty     Fall Risk:  Fall Risk Assessment, last 12 mths 2/22/2023   Able to walk? Yes   Fall in past 12 months? 0   Do you feel unsteady? 1   Are you worried about falling 1   Is the gait abnormal? 1   Number of falls in past 12 months -   Fall with injury?  -      Abuse Screen:  Patient is not abused       Cognitive Screening    Has your family/caregiver stated any concerns about your memory: no     Cognitive Screening: Normal - Clock Drawing Test, Mini Cog Test    Health Maintenance Due     Health Maintenance Due   Topic Date Due    COVID-19 Vaccine (3 - Booster for [address] series) Reviewed       Patient Care Team   Patient Care Team:  Kelin Pacheco MD as PCP - General (Pediatric Medicine)  Kelin Pacheco MD as PCP - REHABILITATION [facility] Empaneled Provider    History     Patient Active Problem List   Diagnosis Code    Hypercholesteremia E78.00    Symptomatic menopausal or female climacteric states N95.1    Vitamin D deficiency E55.9    Obesity, morbid ([facility] [address] E66.01     Past Medical History:   Diagnosis Date    Arthritis     Hypercholesterolemia       Past Surgical History:   Procedure Laterality Date    HX ORTHOPAEDIC      ankle     Current Outpatient Medications   Medication Sig Dispense Refill    Gel-One 30 mg/3 mL syrg injection       folic acid (FOLVITE) 1 mg tablet Take  by mouth daily. cholecalciferol, vitamin D3, 50 mcg (2,000 unit) tab Take  by mouth. ALPHAGAN P 0.1 % ophthalmic solution PLACE 1 DROP IN BOTH EYES TWICE A DAY DISPENSE 15ML BOTTLE  1    pyridoxine, vitamin B6, (VITAMIN B-6) 100 mg tablet Take 1 Tab by mouth daily. 30 Tab 3    cyanocobalamin (VITAMIN B-12) 1,000 mcg sublingual tablet Take 1 tablet by mouth daily. 30 tablet 3    calcium carbonate (TUMS) 200 mg calcium (500 mg) Chew Take 1 Tab by mouth daily. Allergies   Allergen Reactions    Latex Other (comments)     Patient is allergic to the powder in the gloves. Itchy eyes and rash. Family History   Problem Relation Age of Onset    Breast Cancer Mother 54    No Known Problems Father      Social History     Tobacco Use    Smoking status: Never    Smokeless tobacco: Never   Substance Use Topics    Alcohol use:  Yes     Alcohol/week: 0.0 standard drinks     Comment: occasional         Misha Velasquez MD   [address] [employer]  2/22/2023

## 2023-02-22 NOTE — PROGRESS NOTES
Identified pt with two pt identifiers(name and ). Chief Complaint   Patient presents with    Annual Wellness Visit    Arthritis     Patient is having a lot of arthritis pain         Health Maintenance Due   Topic    Shingles Vaccine (1 of 2)    Pneumococcal 65+ years (1 - PCV)    COVID-19 Vaccine (3 - Booster for Moderna series)    Flu Vaccine (1)    Medicare Yearly Exam        Wt Readings from Last 3 Encounters:   23 233 lb 9.6 oz (106 kg)   22 236 lb (107 kg)   10/27/21 244 lb (110.7 kg)     Temp Readings from Last 3 Encounters:   23 98 °F (36.7 °C) (Temporal)   22 98.6 °F (37 °C) (Temporal)   10/27/21 97.9 °F (36.6 °C) (Temporal)     BP Readings from Last 3 Encounters:   23 122/64   22 138/74   10/27/21 120/60     Pulse Readings from Last 3 Encounters:   23 91   22 89   10/27/21 94         Learning Assessment:  :     Learning Assessment 2015 3/11/2014   PRIMARY LEARNER Patient Patient   HIGHEST LEVEL OF EDUCATION - PRIMARY LEARNER  GRADUATED HIGH SCHOOL OR GED GRADUATED HIGH SCHOOL OR GED   BARRIERS PRIMARY LEARNER NONE NONE   CO-LEARNER CAREGIVER No No   PRIMARY LANGUAGE ENGLISH ENGLISH   LEARNER PREFERENCE PRIMARY LISTENING READING     - VIDEOS     - DEMONSTRATION   ANSWERED BY Patient patient   RELATIONSHIP SELF SELF       Depression Screening:  :     3 most recent PHQ Screens 2023   Little interest or pleasure in doing things Not at all   Feeling down, depressed, irritable, or hopeless Not at all   Total Score PHQ 2 0       Fall Risk Assessment:  :     Fall Risk Assessment, last 12 mths 2023   Able to walk? Yes   Fall in past 12 months? 0   Do you feel unsteady? 1   Are you worried about falling 1   Is the gait abnormal? 1   Number of falls in past 12 months -   Fall with injury?  -       Abuse Screening:  :     Abuse Screening Questionnaire 2023 2022 10/27/2021 2020 2017 10/20/2016 10/14/2014   Do you ever feel afraid of your partner? N N N N N N N   Are you in a relationship with someone who physically or mentally threatens you? N N N N N N N   Is it safe for you to go home? Y Y Y Y Y Y Y       Coordination of Care Questionnaire:  :     1) Have you been to an emergency room, urgent care clinic since your last visit? no   Hospitalized since your last visit? no             2) Have you seen or consulted any other health care providers outside of [address] [place] [address] since your last visit? no  (Include any pap smears or colon screenings in this section.)    3) Do you have an Advance Directive on file? no  Are you interested in receiving information about Advance Directives? no    Patient is accompanied by self I have received verbal consent from Linda Rodas to discuss any/all medical information while they are present in the room. 4.  For patients aged 39-70: Has the patient had a colonoscopy / FIT/ Cologuard? NA - based on age      If the patient is female:    11. For patients aged 41-77: Has the patient had a mammogram within the past 2 years? NA - based on age or sex      10. For patients aged 21-65: Has the patient had a pap smear?  NA - based on age or sex

## 2023-07-18 ENCOUNTER — TRANSCRIBE ORDERS (OUTPATIENT)
Facility: HOSPITAL | Age: 83
End: 2023-07-18

## 2023-07-18 DIAGNOSIS — Z12.31 ENCOUNTER FOR SCREENING MAMMOGRAM FOR MALIGNANT NEOPLASM OF BREAST: Primary | ICD-10-CM

## 2023-07-27 ENCOUNTER — HOSPITAL ENCOUNTER (OUTPATIENT)
Facility: HOSPITAL | Age: 83
Discharge: HOME | End: 2023-07-27
Attending: INTERNAL MEDICINE
Payer: MEDICARE

## 2023-07-27 DIAGNOSIS — Z12.31 ENCOUNTER FOR SCREENING MAMMOGRAM FOR MALIGNANT NEOPLASM OF BREAST: ICD-10-CM

## 2023-07-27 PROCEDURE — 77067 SCR MAMMO BI INCL CAD: CPT

## 2023-10-02 ENCOUNTER — TELEPHONE (OUTPATIENT)
Age: 83
End: 2023-10-02

## 2023-10-02 NOTE — TELEPHONE ENCOUNTER
----- Message from [facility] CASANDRA Love sent at 10/2/2023  2:27 PM EDT -----  Subject: Referral Request    Reason for referral request? Routine lab work  Provider patient wants to be referred to(if known): Terrance Tena    Provider Phone Number(if known):     Additional Information for Provider?   ---------------------------------------------------------------------------  --------------  Oscar [place] Holley    [phone]; OK to leave message on voicemail  ---------------------------------------------------------------------------  --------------

## 2023-10-25 ENCOUNTER — OFFICE VISIT (OUTPATIENT)
Age: 83
End: 2023-10-25
Payer: MEDICARE

## 2023-10-25 VITALS
RESPIRATION RATE: 16 BRPM | BODY MASS INDEX: 35.94 KG/M2 | HEIGHT: 67 IN | TEMPERATURE: 98.2 F | DIASTOLIC BLOOD PRESSURE: 62 MMHG | SYSTOLIC BLOOD PRESSURE: 126 MMHG | OXYGEN SATURATION: 97 % | WEIGHT: 229 LBS | HEART RATE: 87 BPM

## 2023-10-25 DIAGNOSIS — E66.01 OBESITY, MORBID (HCC): ICD-10-CM

## 2023-10-25 DIAGNOSIS — E55.9 VITAMIN D DEFICIENCY, UNSPECIFIED: ICD-10-CM

## 2023-10-25 DIAGNOSIS — E78.00 HYPERCHOLESTEREMIA: Primary | ICD-10-CM

## 2023-10-25 DIAGNOSIS — M17.0 OSTEOARTHRITIS OF BOTH KNEES, UNSPECIFIED OSTEOARTHRITIS TYPE: ICD-10-CM

## 2023-10-25 PROCEDURE — 99213 OFFICE O/P EST LOW 20 MIN: CPT | Performed by: INTERNAL MEDICINE

## 2023-10-25 SDOH — ECONOMIC STABILITY: INCOME INSECURITY: HOW HARD IS IT FOR YOU TO PAY FOR THE VERY BASICS LIKE FOOD, HOUSING, MEDICAL CARE, AND HEATING?: NOT HARD AT ALL

## 2023-10-25 SDOH — ECONOMIC STABILITY: FOOD INSECURITY: WITHIN THE PAST 12 MONTHS, YOU WORRIED THAT YOUR FOOD WOULD RUN OUT BEFORE YOU GOT MONEY TO BUY MORE.: NEVER TRUE

## 2023-10-25 SDOH — ECONOMIC STABILITY: HOUSING INSECURITY
IN THE LAST 12 MONTHS, WAS THERE A TIME WHEN YOU DID NOT HAVE A STEADY PLACE TO SLEEP OR SLEPT IN A SHELTER (INCLUDING NOW)?: NO

## 2023-10-25 SDOH — ECONOMIC STABILITY: FOOD INSECURITY: WITHIN THE PAST 12 MONTHS, THE FOOD YOU BOUGHT JUST DIDN'T LAST AND YOU DIDN'T HAVE MONEY TO GET MORE.: NEVER TRUE

## 2023-10-25 ASSESSMENT — PATIENT HEALTH QUESTIONNAIRE - PHQ9
SUM OF ALL RESPONSES TO PHQ QUESTIONS 1-9: 0
SUM OF ALL RESPONSES TO PHQ QUESTIONS 1-9: 0
SUM OF ALL RESPONSES TO PHQ9 QUESTIONS 1 & 2: 0
SUM OF ALL RESPONSES TO PHQ QUESTIONS 1-9: 0
SUM OF ALL RESPONSES TO PHQ QUESTIONS 1-9: 0
1. LITTLE INTEREST OR PLEASURE IN DOING THINGS: 0
2. FEELING DOWN, DEPRESSED OR HOPELESS: 0

## 2023-10-25 NOTE — PROGRESS NOTES
CC:  Chief Complaint   Patient presents with    Follow-up     Pt here today to follow up because she has not been seen in a while. She needs labs. Pt is not fasting. Assessment/Plan:   1. Hypercholesteremia  -     CBC with Auto Differential; Future  -     Comprehensive Metabolic Panel; Future  -     Lipid Panel; Future  2. Vitamin D deficiency, unspecified  -     Vitamin D 25 Hydroxy; Future  3. Obesity, morbid ([address])  -     CBC with Auto Differential; Future  4. Osteoarthritis of both knees, unspecified osteoarthritis type - worsening    [facility] He has offered surgery in the past, but she would like to hold off at this time. She does have an upcoming appointment with him in December. She has noticed that the situation seems to be worsening. At this time, she might be open for procedure to maintain her mobility. I have discussed the diagnosis with the patient and the intended treatment plan as seen in the above orders. The patient has received an after-visit summary and questions were answered concerning future plans. Asked to return should symptoms worsen or not improve with treatment. Any pending labs and studies will be relayed to patient when they become available. Pt verbalizes understanding of plan of care and denies further questions or concerns at this time. Follow up:  Return if symptoms worsen or fail to improve. Every 6-months for chronic problems. Subjective:     Jefferson Carnes is a 78 y.o. female who presents for follow up of hyperlipidemia. She is not fasting this afternoon. She would like to return for fasting labs. She is not really taking any medications except for vitamin B12. She has generally been doing well, but suffers from OA of both knees as we discussed above.      Diet and Lifestyle:   Home BP Monitoring:     Cardiovascular ROS: taking medications as instructed, no medication side effects noted, no TIA's, no chest pain on exertion, no

## 2024-01-02 ENCOUNTER — TELEPHONE (OUTPATIENT)
Age: 84
End: 2024-01-02

## 2024-01-02 DIAGNOSIS — M54.40 ACUTE BILATERAL LOW BACK PAIN WITH SCIATICA, SCIATICA LATERALITY UNSPECIFIED: Primary | ICD-10-CM

## 2024-01-02 DIAGNOSIS — M54.40 ACUTE BILATERAL LOW BACK PAIN WITH SCIATICA, SCIATICA LATERALITY UNSPECIFIED: ICD-10-CM

## 2024-01-02 RX ORDER — TIZANIDINE 2 MG/1
TABLET ORAL
Qty: 10 TABLET | Refills: 0 | Status: SHIPPED | OUTPATIENT
Start: 2024-01-02

## 2024-01-02 RX ORDER — TIZANIDINE 2 MG/1
TABLET ORAL
Qty: 10 TABLET | Refills: 0 | Status: SHIPPED | OUTPATIENT
Start: 2024-01-02 | End: 2024-01-02 | Stop reason: SDUPTHER

## 2024-01-02 RX ORDER — TIZANIDINE 2 MG/1
TABLET ORAL
Qty: 10 TABLET | Refills: 0 | Status: CANCELLED | OUTPATIENT
Start: 2024-01-02

## 2024-01-02 NOTE — TELEPHONE ENCOUNTER
----- Message from Jennifer Ch MA sent at 1/2/2024  1:37 PM EST -----  Subject: Message to Provider    QUESTIONS  Information for Provider? Patient is calling back about message left   lee today. Patient states that her her bed is causing her to have   severe back pain and wants to see if Tim Rojas can send in a prescription   for pain. Would like it sent to [facility]. Please advise   ---------------------------------------------------------------------------  --------------  CALL BACK INFO  [phone]; Do not leave any message, patient will call back for answer  ---------------------------------------------------------------------------  --------------  SCRIPT ANSWERS  Relationship to Patient? Self

## 2024-01-02 NOTE — TELEPHONE ENCOUNTER
Patient is requesting a pain medication for her back. She stated that it is bothering her so bad, since yesterday. Patient says that she has not fallen or anything, but her mattress is causing the back pain.     Patient stated that she should not have to wait all day, for a medication to be sent to the pharmacy. She said that she will continue calling, until the medication has been sent. I explained to patient that Dr Dumont is in clinic, and cannot stop seeing patients to send her a prescription to the pharmacy; and there is not need to call back. We will call her once Dr. Dumont has responded to our messages. Patient became irate, raising her voice with me. I told patient that I will send the message, but this is out of my control. She continued to be rude, so I disconnected the call.

## 2024-04-05 ENCOUNTER — NURSE ONLY (OUTPATIENT)
Age: 84
End: 2024-04-05

## 2024-04-05 DIAGNOSIS — E78.00 HYPERCHOLESTEREMIA: ICD-10-CM

## 2024-04-05 DIAGNOSIS — E55.9 VITAMIN D DEFICIENCY, UNSPECIFIED: ICD-10-CM

## 2024-04-05 DIAGNOSIS — E66.01 OBESITY, MORBID (HCC): ICD-10-CM

## 2024-04-06 LAB
25(OH)D3 SERPL-MCNC: 21.3 NG/ML (ref 30–100)
ALBUMIN SERPL-MCNC: 3.4 G/DL (ref 3.5–5)
ALBUMIN/GLOB SERPL: 0.9 (ref 1.1–2.2)
ALP SERPL-CCNC: 78 U/L (ref 45–117)
ALT SERPL-CCNC: 14 U/L (ref 12–78)
ANION GAP SERPL CALC-SCNC: 5 MMOL/L (ref 5–15)
AST SERPL-CCNC: 11 U/L (ref 15–37)
BASOPHILS # BLD: 0 K/UL (ref 0–0.1)
BASOPHILS NFR BLD: 0 % (ref 0–1)
BILIRUB SERPL-MCNC: 1 MG/DL (ref 0.2–1)
BUN SERPL-MCNC: 14 MG/DL (ref 6–20)
BUN/CREAT SERPL: 15 (ref 12–20)
CALCIUM SERPL-MCNC: 9.1 MG/DL (ref 8.5–10.1)
CHLORIDE SERPL-SCNC: 110 MMOL/L (ref 97–108)
CHOLEST SERPL-MCNC: 248 MG/DL
CO2 SERPL-SCNC: 24 MMOL/L (ref 21–32)
CREAT SERPL-MCNC: 0.96 MG/DL (ref 0.55–1.02)
DIFFERENTIAL METHOD BLD: NORMAL
EOSINOPHIL # BLD: 0.2 K/UL (ref 0–0.4)
EOSINOPHIL NFR BLD: 3 % (ref 0–7)
ERYTHROCYTE [DISTWIDTH] IN BLOOD BY AUTOMATED COUNT: 13.3 % (ref 11.5–14.5)
GLOBULIN SER CALC-MCNC: 4 G/DL (ref 2–4)
GLUCOSE SERPL-MCNC: 111 MG/DL (ref 65–100)
HCT VFR BLD AUTO: 42.4 % (ref 35–47)
HDLC SERPL-MCNC: 70 MG/DL
HDLC SERPL: 3.5 (ref 0–5)
HGB BLD-MCNC: 13.8 G/DL (ref 11.5–16)
IMM GRANULOCYTES # BLD AUTO: 0 K/UL (ref 0–0.04)
IMM GRANULOCYTES NFR BLD AUTO: 0 % (ref 0–0.5)
LDLC SERPL CALC-MCNC: 162.2 MG/DL (ref 0–100)
LYMPHOCYTES # BLD: 1.5 K/UL (ref 0.8–3.5)
LYMPHOCYTES NFR BLD: 21 % (ref 12–49)
MCH RBC QN AUTO: 29.4 PG (ref 26–34)
MCHC RBC AUTO-ENTMCNC: 32.5 G/DL (ref 30–36.5)
MCV RBC AUTO: 90.2 FL (ref 80–99)
MONOCYTES # BLD: 0.4 K/UL (ref 0–1)
MONOCYTES NFR BLD: 6 % (ref 5–13)
NEUTS SEG # BLD: 5 K/UL (ref 1.8–8)
NEUTS SEG NFR BLD: 70 % (ref 32–75)
NRBC # BLD: 0 K/UL (ref 0–0.01)
NRBC BLD-RTO: 0 PER 100 WBC
PLATELET # BLD AUTO: 294 K/UL (ref 150–400)
PMV BLD AUTO: 9.6 FL (ref 8.9–12.9)
POTASSIUM SERPL-SCNC: 4.4 MMOL/L (ref 3.5–5.1)
PROT SERPL-MCNC: 7.4 G/DL (ref 6.4–8.2)
RBC # BLD AUTO: 4.7 M/UL (ref 3.8–5.2)
SODIUM SERPL-SCNC: 139 MMOL/L (ref 136–145)
TRIGL SERPL-MCNC: 79 MG/DL
VLDLC SERPL CALC-MCNC: 15.8 MG/DL
WBC # BLD AUTO: 7.2 K/UL (ref 3.6–11)

## 2024-04-09 ENCOUNTER — TELEPHONE (OUTPATIENT)
Age: 84
End: 2024-04-09

## 2024-04-09 NOTE — TELEPHONE ENCOUNTER
----- Message from Vandana Dumont MD sent at 4/9/2024  7:42 AM EDT -----  Please ask patient to make an appointment with me to go over her labs.   They were not normal. I need to discuss. Nothing emergent, but she does need to be seen.   Thanks

## 2024-04-10 ENCOUNTER — TELEPHONE (OUTPATIENT)
Age: 84
End: 2024-04-10

## 2024-04-10 NOTE — TELEPHONE ENCOUNTER
Patient called in regards to lab results. I relayed message and scheduled appointment to discuss. Patient stated that she recently spoke with someone in regards to a prescription being sent to [facility]. She does not know the name of the medication. I do not see any documentation of this. She would like a call once the prescription has been sent.     [facility]/PHARMACY #[id number] - [place] - [address] -  [phone] - F [phone] [[id number]]

## 2024-04-11 DIAGNOSIS — M54.40 ACUTE BILATERAL LOW BACK PAIN WITH SCIATICA, SCIATICA LATERALITY UNSPECIFIED: ICD-10-CM

## 2024-04-11 RX ORDER — TIZANIDINE 2 MG/1
TABLET ORAL
Qty: 30 TABLET | Refills: 3 | Status: SHIPPED | OUTPATIENT
Start: 2024-04-11

## 2024-04-12 NOTE — TELEPHONE ENCOUNTER
Spoke to patient regarding the medication request and patient is stating that it is not for her tizanidine the medication she is inquiring about is a recommendation pertaining to recent lab work she had done. I previously called on 4/9 regarding results but was unable to reach her so a VM was left. Patient has an upcoming appointment in May.

## 2024-04-26 DIAGNOSIS — M54.40 ACUTE BILATERAL LOW BACK PAIN WITH SCIATICA, SCIATICA LATERALITY UNSPECIFIED: ICD-10-CM

## 2024-04-28 RX ORDER — TIZANIDINE 2 MG/1
TABLET ORAL
Qty: 180 TABLET | Refills: 1 | Status: SHIPPED | OUTPATIENT
Start: 2024-04-28

## 2024-10-09 ENCOUNTER — TRANSCRIBE ORDERS (OUTPATIENT)
Facility: HOSPITAL | Age: 84
End: 2024-10-09

## 2024-10-09 DIAGNOSIS — Z12.31 VISIT FOR SCREENING MAMMOGRAM: Primary | ICD-10-CM

## 2025-04-01 ENCOUNTER — TELEPHONE (OUTPATIENT)
Age: 85
End: 2025-04-01

## 2025-04-01 NOTE — TELEPHONE ENCOUNTER
Pt would like to speak with  to discuss a urine problem she has been having. She said that Dr offered to give Rx to help stop uncontrolled urine but she had denied it. She has now changed her mind and would like the Rx sent to the Pharmacy.    [facility]/pharmacy #[id number] - [place], [place] - [address] -  [phone] - F [phone]  [address] [place] [id number]  Phone: [phone]  Fax: [phone]       Please advise if an appt is needed